# Patient Record
Sex: FEMALE | Race: WHITE | Employment: STUDENT | URBAN - METROPOLITAN AREA
[De-identification: names, ages, dates, MRNs, and addresses within clinical notes are randomized per-mention and may not be internally consistent; named-entity substitution may affect disease eponyms.]

---
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
## 2023-04-06 ENCOUNTER — HOSPITAL ENCOUNTER (OUTPATIENT)
Age: 18
Discharge: HOME | End: 2023-04-06
Payer: MEDICAID

## 2023-04-06 VITALS — OXYGEN SATURATION: 100 % | RESPIRATION RATE: 18 BRPM | HEART RATE: 72 BPM | TEMPERATURE: 98 F

## 2023-04-06 DIAGNOSIS — J02.9 SORE THROAT: Primary | ICD-10-CM

## 2023-04-06 DIAGNOSIS — H92.09 EARACHE: ICD-10-CM

## 2023-04-06 LAB
S PYO AG THROAT QL IA.RAPID: NEGATIVE
SARS-COV-2 RNA RESP QL NAA+PROBE: NOT DETECTED

## 2023-04-06 PROCEDURE — 99213 OFFICE O/P EST LOW 20 MIN: CPT

## 2023-04-06 PROCEDURE — 87651 STREP A DNA AMP PROBE: CPT | Performed by: PHYSICIAN ASSISTANT

## 2023-04-06 RX ORDER — DEXAMETHASONE SODIUM PHOSPHATE 10 MG/ML
10 INJECTION, SOLUTION INTRAMUSCULAR; INTRAVENOUS ONCE
Status: COMPLETED | OUTPATIENT
Start: 2023-04-06 | End: 2023-04-06

## 2023-04-06 RX ORDER — AMOXICILLIN 500 MG/1
500 TABLET, FILM COATED ORAL 3 TIMES DAILY
Qty: 30 TABLET | Refills: 0 | Status: SHIPPED | OUTPATIENT
Start: 2023-04-06 | End: 2023-04-16

## 2023-04-06 NOTE — DISCHARGE INSTRUCTIONS
Would recommend Advil Cold and Sinus (Sudafed-containing) x 2 days    Amoxicillin if no significant improvement in ear pain

## 2023-05-30 ENCOUNTER — LAB ENCOUNTER (OUTPATIENT)
Dept: LAB | Age: 18
End: 2023-05-30
Attending: NURSE PRACTITIONER
Payer: MEDICAID

## 2023-05-30 ENCOUNTER — OFFICE VISIT (OUTPATIENT)
Dept: INTERNAL MEDICINE CLINIC | Facility: CLINIC | Age: 18
End: 2023-05-30

## 2023-05-30 VITALS
SYSTOLIC BLOOD PRESSURE: 119 MMHG | WEIGHT: 209 LBS | HEIGHT: 59 IN | HEART RATE: 86 BPM | BODY MASS INDEX: 42.13 KG/M2 | DIASTOLIC BLOOD PRESSURE: 73 MMHG

## 2023-05-30 DIAGNOSIS — Z13.0 SCREENING FOR DEFICIENCY ANEMIA: ICD-10-CM

## 2023-05-30 DIAGNOSIS — M35.9 AUTOIMMUNE DISEASE (HCC): Primary | ICD-10-CM

## 2023-05-30 DIAGNOSIS — M35.9 AUTOIMMUNE DISEASE (HCC): ICD-10-CM

## 2023-05-30 DIAGNOSIS — Z13.29 THYROID DISORDER SCREEN: ICD-10-CM

## 2023-05-30 DIAGNOSIS — Z98.811: ICD-10-CM

## 2023-05-30 DIAGNOSIS — Z13.21 ENCOUNTER FOR VITAMIN DEFICIENCY SCREENING: ICD-10-CM

## 2023-05-30 DIAGNOSIS — S89.92XA INJURY OF LEFT KNEE, INITIAL ENCOUNTER: ICD-10-CM

## 2023-05-30 DIAGNOSIS — Z00.00 WELLNESS EXAMINATION: ICD-10-CM

## 2023-05-30 DIAGNOSIS — Z00.00 WELLNESS EXAMINATION: Primary | ICD-10-CM

## 2023-05-30 DIAGNOSIS — G90.4 AUTONOMIC DYSREFLEXIA: ICD-10-CM

## 2023-05-30 DIAGNOSIS — Z13.220 LIPID SCREENING: ICD-10-CM

## 2023-05-30 DIAGNOSIS — J03.01 RECURRENT STREPTOCOCCAL TONSILLITIS: ICD-10-CM

## 2023-05-30 DIAGNOSIS — S14.109A INJURY OF CERVICAL SPINE, INITIAL ENCOUNTER (HCC): ICD-10-CM

## 2023-05-30 LAB
ALBUMIN SERPL-MCNC: 4.3 G/DL (ref 3.4–5)
ALBUMIN/GLOB SERPL: 1 {RATIO} (ref 1–2)
ALP LIVER SERPL-CCNC: 65 U/L
ALT SERPL-CCNC: 49 U/L
ANION GAP SERPL CALC-SCNC: 5 MMOL/L (ref 0–18)
AST SERPL-CCNC: 32 U/L (ref 15–37)
BASOPHILS # BLD AUTO: 0.03 X10(3) UL (ref 0–0.2)
BASOPHILS NFR BLD AUTO: 0.4 %
BILIRUB SERPL-MCNC: 0.3 MG/DL (ref 0.1–2)
BUN BLD-MCNC: 14 MG/DL (ref 7–18)
BUN/CREAT SERPL: 21.5 (ref 10–20)
CALCIUM BLD-MCNC: 9.9 MG/DL (ref 8.5–10.1)
CHLORIDE SERPL-SCNC: 110 MMOL/L (ref 98–112)
CHOLEST SERPL-MCNC: 213 MG/DL (ref ?–200)
CO2 SERPL-SCNC: 27 MMOL/L (ref 21–32)
CREAT BLD-MCNC: 0.65 MG/DL
CRP SERPL-MCNC: 2.33 MG/DL (ref ?–0.3)
DEPRECATED RDW RBC AUTO: 40 FL (ref 35.1–46.3)
EOSINOPHIL # BLD AUTO: 0.08 X10(3) UL (ref 0–0.7)
EOSINOPHIL NFR BLD AUTO: 1 %
ERYTHROCYTE [DISTWIDTH] IN BLOOD BY AUTOMATED COUNT: 13.3 % (ref 11–15)
ERYTHROCYTE [SEDIMENTATION RATE] IN BLOOD: 60 MM/HR
FASTING PATIENT LIPID ANSWER: YES
FASTING STATUS PATIENT QL REPORTED: YES
GFR SERPLBLD BASED ON 1.73 SQ M-ARVRAT: 130 ML/MIN/1.73M2 (ref 60–?)
GLOBULIN PLAS-MCNC: 4.1 G/DL (ref 2.8–4.4)
GLUCOSE BLD-MCNC: 84 MG/DL (ref 70–99)
HCT VFR BLD AUTO: 38.7 %
HDLC SERPL-MCNC: 30 MG/DL (ref 40–59)
HGB BLD-MCNC: 12.7 G/DL
IMM GRANULOCYTES # BLD AUTO: 0.08 X10(3) UL (ref 0–1)
IMM GRANULOCYTES NFR BLD: 1 %
LDLC SERPL CALC-MCNC: 136 MG/DL (ref ?–100)
LYMPHOCYTES # BLD AUTO: 2.31 X10(3) UL (ref 1.5–5)
LYMPHOCYTES NFR BLD AUTO: 30.2 %
MCH RBC QN AUTO: 27.4 PG (ref 26–34)
MCHC RBC AUTO-ENTMCNC: 32.8 G/DL (ref 31–37)
MCV RBC AUTO: 83.4 FL
MONOCYTES # BLD AUTO: 0.48 X10(3) UL (ref 0.1–1)
MONOCYTES NFR BLD AUTO: 6.3 %
NEUTROPHILS # BLD AUTO: 4.68 X10 (3) UL (ref 1.5–7.7)
NEUTROPHILS # BLD AUTO: 4.68 X10(3) UL (ref 1.5–7.7)
NEUTROPHILS NFR BLD AUTO: 61.1 %
NONHDLC SERPL-MCNC: 183 MG/DL (ref ?–130)
OSMOLALITY SERPL CALC.SUM OF ELEC: 294 MOSM/KG (ref 275–295)
PLATELET # BLD AUTO: 313 10(3)UL (ref 150–450)
POTASSIUM SERPL-SCNC: 4 MMOL/L (ref 3.5–5.1)
PROT SERPL-MCNC: 8.4 G/DL (ref 6.4–8.2)
RBC # BLD AUTO: 4.64 X10(6)UL
SODIUM SERPL-SCNC: 142 MMOL/L (ref 136–145)
TRIGL SERPL-MCNC: 259 MG/DL (ref 30–149)
TSI SER-ACNC: 1.25 MIU/ML (ref 0.36–3.74)
VIT D+METAB SERPL-MCNC: 16.9 NG/ML (ref 30–100)
VLDLC SERPL CALC-MCNC: 48 MG/DL (ref 0–30)
WBC # BLD AUTO: 7.7 X10(3) UL (ref 4–11)

## 2023-05-30 PROCEDURE — 3074F SYST BP LT 130 MM HG: CPT | Performed by: NURSE PRACTITIONER

## 2023-05-30 PROCEDURE — 80053 COMPREHEN METABOLIC PANEL: CPT

## 2023-05-30 PROCEDURE — 86140 C-REACTIVE PROTEIN: CPT

## 2023-05-30 PROCEDURE — 86038 ANTINUCLEAR ANTIBODIES: CPT

## 2023-05-30 PROCEDURE — 82300 ASSAY OF CADMIUM: CPT

## 2023-05-30 PROCEDURE — 82175 ASSAY OF ARSENIC: CPT

## 2023-05-30 PROCEDURE — 99204 OFFICE O/P NEW MOD 45 MIN: CPT | Performed by: NURSE PRACTITIONER

## 2023-05-30 PROCEDURE — 86225 DNA ANTIBODY NATIVE: CPT

## 2023-05-30 PROCEDURE — 36415 COLL VENOUS BLD VENIPUNCTURE: CPT

## 2023-05-30 PROCEDURE — 82306 VITAMIN D 25 HYDROXY: CPT

## 2023-05-30 PROCEDURE — 84443 ASSAY THYROID STIM HORMONE: CPT

## 2023-05-30 PROCEDURE — 83655 ASSAY OF LEAD: CPT

## 2023-05-30 PROCEDURE — 3078F DIAST BP <80 MM HG: CPT | Performed by: NURSE PRACTITIONER

## 2023-05-30 PROCEDURE — 3008F BODY MASS INDEX DOCD: CPT | Performed by: NURSE PRACTITIONER

## 2023-05-30 PROCEDURE — 83825 ASSAY OF MERCURY: CPT

## 2023-05-30 PROCEDURE — 99385 PREV VISIT NEW AGE 18-39: CPT | Performed by: NURSE PRACTITIONER

## 2023-05-30 PROCEDURE — 85025 COMPLETE CBC W/AUTO DIFF WBC: CPT

## 2023-05-30 PROCEDURE — 80061 LIPID PANEL: CPT

## 2023-05-30 PROCEDURE — 85652 RBC SED RATE AUTOMATED: CPT

## 2023-06-01 LAB
ARSENIC BLOOD: 3 UG/L
CADMIUM, BLOOD: <0.5 UG/L
DSDNA IGG SERPL IA-ACNC: 2.3 IU/ML
ENA AB SER QL IA: 0.1 UG/L
ENA AB SER QL IA: NEGATIVE
LEAD BLOOD: <1 UG/DL
MERCURY BLOOD: 4.1 UG/L

## 2023-10-25 ENCOUNTER — PATIENT MESSAGE (OUTPATIENT)
Dept: INTERNAL MEDICINE CLINIC | Facility: CLINIC | Age: 18
End: 2023-10-25

## 2023-10-26 NOTE — TELEPHONE ENCOUNTER
From: Lady Downey  To: Carlin Washington. Sas  Sent: 10/25/2023 4:16 PM CDT  Subject: UTI    Hi Kedar Bhandari, I have a UTI and I left my prescription in [place]. Is there anyway you can send me one to the pharmacy?

## 2023-10-30 ENCOUNTER — OFFICE VISIT (OUTPATIENT)
Dept: INTERNAL MEDICINE CLINIC | Facility: CLINIC | Age: 18
End: 2023-10-30

## 2023-10-30 VITALS
HEART RATE: 101 BPM | WEIGHT: 209 LBS | SYSTOLIC BLOOD PRESSURE: 135 MMHG | HEIGHT: 59 IN | BODY MASS INDEX: 42.13 KG/M2 | DIASTOLIC BLOOD PRESSURE: 82 MMHG

## 2023-10-30 DIAGNOSIS — N63.0 MASS OF BREAST, UNSPECIFIED LATERALITY: ICD-10-CM

## 2023-10-30 DIAGNOSIS — N83.201 BILATERAL OVARIAN CYSTS: Primary | ICD-10-CM

## 2023-10-30 DIAGNOSIS — N83.202 BILATERAL OVARIAN CYSTS: Primary | ICD-10-CM

## 2023-10-30 PROCEDURE — 3008F BODY MASS INDEX DOCD: CPT | Performed by: NURSE PRACTITIONER

## 2023-10-30 PROCEDURE — 3079F DIAST BP 80-89 MM HG: CPT | Performed by: NURSE PRACTITIONER

## 2023-10-30 PROCEDURE — 99214 OFFICE O/P EST MOD 30 MIN: CPT | Performed by: NURSE PRACTITIONER

## 2023-10-30 PROCEDURE — 3075F SYST BP GE 130 - 139MM HG: CPT | Performed by: NURSE PRACTITIONER

## 2023-11-07 ENCOUNTER — HOSPITAL ENCOUNTER (OUTPATIENT)
Age: 18
Discharge: OTHER INSTITUTION | End: 2023-11-07
Payer: MEDICAID

## 2023-11-07 ENCOUNTER — APPOINTMENT (OUTPATIENT)
Dept: ULTRASOUND IMAGING | Age: 18
End: 2023-11-07
Attending: PHYSICIAN ASSISTANT
Payer: MEDICAID

## 2023-11-07 ENCOUNTER — APPOINTMENT (OUTPATIENT)
Dept: CT IMAGING | Age: 18
End: 2023-11-07
Attending: PHYSICIAN ASSISTANT
Payer: MEDICAID

## 2023-11-07 ENCOUNTER — HOSPITAL ENCOUNTER (INPATIENT)
Facility: HOSPITAL | Age: 18
LOS: 1 days | Discharge: HOME | End: 2023-11-09
Attending: EMERGENCY MEDICINE | Admitting: STUDENT IN AN ORGANIZED HEALTH CARE EDUCATION/TRAINING PROGRAM
Payer: MEDICAID

## 2023-11-07 ENCOUNTER — HOSPITAL ENCOUNTER (OUTPATIENT)
Age: 18
Discharge: HOME | End: 2023-11-07
Payer: MEDICAID

## 2023-11-07 VITALS
HEART RATE: 107 BPM | TEMPERATURE: 100 F | OXYGEN SATURATION: 96 % | SYSTOLIC BLOOD PRESSURE: 155 MMHG | DIASTOLIC BLOOD PRESSURE: 75 MMHG | RESPIRATION RATE: 18 BRPM

## 2023-11-07 DIAGNOSIS — N70.93 TOA (TUBO-OVARIAN ABSCESS): Primary | ICD-10-CM

## 2023-11-07 LAB
#MXD IC: 0.9 X10ˆ3/UL (ref 0.1–1)
ALBUMIN SERPL-MCNC: 4.7 G/DL (ref 3.2–4.8)
ALP LIVER SERPL-CCNC: 78 U/L
ALT SERPL-CCNC: 31 U/L
ANION GAP SERPL CALC-SCNC: 10 MMOL/L (ref 0–18)
AST SERPL-CCNC: 21 U/L (ref ?–34)
B-HCG UR QL: NEGATIVE
BASOPHILS # BLD AUTO: 0.02 X10(3) UL (ref 0–0.2)
BASOPHILS NFR BLD AUTO: 0.1 %
BILIRUB DIRECT SERPL-MCNC: <0.1 MG/DL (ref ?–0.3)
BILIRUB SERPL-MCNC: 0.3 MG/DL (ref 0.3–1.2)
BILIRUB UR QL STRIP: NEGATIVE
BUN BLD-MCNC: 7 MG/DL (ref 9–23)
BUN BLD-MCNC: 8 MG/DL (ref 7–18)
BUN/CREAT SERPL: 9.2 (ref 10–20)
CALCIUM BLD-MCNC: 9.6 MG/DL (ref 8.7–10.4)
CHLORIDE BLD-SCNC: 105 MMOL/L (ref 98–112)
CHLORIDE SERPL-SCNC: 106 MMOL/L (ref 98–112)
CLARITY UR: CLEAR
CO2 BLD-SCNC: 24 MMOL/L (ref 21–32)
CO2 SERPL-SCNC: 22 MMOL/L (ref 21–32)
COLOR UR: YELLOW
CREAT BLD-MCNC: 0.7 MG/DL
CREAT BLD-MCNC: 0.76 MG/DL
DEPRECATED RDW RBC AUTO: 40.3 FL (ref 35.1–46.3)
EGFRCR SERPLBLD CKD-EPI 2021: 116 ML/MIN/1.73M2 (ref 60–?)
EGFRCR SERPLBLD CKD-EPI 2021: 128 ML/MIN/1.73M2 (ref 60–?)
EOSINOPHIL # BLD AUTO: 0.02 X10(3) UL (ref 0–0.7)
EOSINOPHIL NFR BLD AUTO: 0.1 %
ERYTHROCYTE [DISTWIDTH] IN BLOOD BY AUTOMATED COUNT: 13.2 % (ref 11–15)
GLUCOSE BLD-MCNC: 106 MG/DL (ref 70–99)
GLUCOSE BLD-MCNC: 99 MG/DL (ref 70–99)
GLUCOSE UR STRIP-MCNC: NEGATIVE MG/DL
HCT VFR BLD AUTO: 35.6 %
HCT VFR BLD AUTO: 36.6 %
HCT VFR BLD CALC: 37 %
HGB BLD-MCNC: 12.1 G/DL
HGB BLD-MCNC: 12.2 G/DL
IMM GRANULOCYTES # BLD AUTO: 0.07 X10(3) UL (ref 0–1)
IMM GRANULOCYTES NFR BLD: 0.5 %
ISTAT IONIZED CALCIUM FOR CHEM 8: 1.17 MMOL/L (ref 1.12–1.32)
KETONES UR STRIP-MCNC: NEGATIVE MG/DL
LACTATE SERPL-SCNC: 0.8 MMOL/L (ref 0.5–2)
LYMPHOCYTES # BLD AUTO: 1.4 X10ˆ3/UL (ref 1.5–5)
LYMPHOCYTES # BLD AUTO: 1.85 X10(3) UL (ref 1.5–5)
LYMPHOCYTES NFR BLD AUTO: 11 %
LYMPHOCYTES NFR BLD AUTO: 13.8 %
MCH RBC QN AUTO: 27.5 PG (ref 26–34)
MCH RBC QN AUTO: 28.1 PG (ref 26–34)
MCHC RBC AUTO-ENTMCNC: 33.3 G/DL (ref 31–37)
MCHC RBC AUTO-ENTMCNC: 34 G/DL (ref 31–37)
MCV RBC AUTO: 82.6 FL
MCV RBC AUTO: 82.6 FL (ref 80–100)
MIXED CELL %: 7.1 %
MONOCYTES # BLD AUTO: 0.86 X10(3) UL (ref 0.1–1)
MONOCYTES NFR BLD AUTO: 6.4 %
NEUTROPHILS # BLD AUTO: 10.2 X10ˆ3/UL (ref 1.5–7.7)
NEUTROPHILS # BLD AUTO: 10.58 X10 (3) UL (ref 1.5–7.7)
NEUTROPHILS # BLD AUTO: 10.58 X10(3) UL (ref 1.5–7.7)
NEUTROPHILS NFR BLD AUTO: 79.1 %
NEUTROPHILS NFR BLD AUTO: 81.9 %
NITRITE UR QL STRIP: POSITIVE
OSMOLALITY SERPL CALC.SUM OF ELEC: 284 MOSM/KG (ref 275–295)
PH UR STRIP: 7.5 [PH]
PLATELET # BLD AUTO: 315 X10ˆ3/UL (ref 150–450)
PLATELET # BLD AUTO: 331 10(3)UL (ref 150–450)
POTASSIUM BLD-SCNC: 4 MMOL/L (ref 3.6–5.1)
POTASSIUM SERPL-SCNC: 3.8 MMOL/L (ref 3.5–5.1)
PROT SERPL-MCNC: 8.2 G/DL (ref 5.7–8.2)
PROT UR STRIP-MCNC: 30 MG/DL
RBC # BLD AUTO: 4.31 X10(6)UL
RBC # BLD AUTO: 4.43 X10ˆ6/UL
SODIUM BLD-SCNC: 140 MMOL/L (ref 136–145)
SODIUM SERPL-SCNC: 138 MMOL/L (ref 136–145)
SP GR UR STRIP: 1.02
UROBILINOGEN UR STRIP-ACNC: 2 MG/DL
WBC # BLD AUTO: 12.5 X10ˆ3/UL (ref 4–11)
WBC # BLD AUTO: 13.4 X10(3) UL (ref 4–11)

## 2023-11-07 PROCEDURE — 87086 URINE CULTURE/COLONY COUNT: CPT | Performed by: PHYSICIAN ASSISTANT

## 2023-11-07 PROCEDURE — 85025 COMPLETE CBC W/AUTO DIFF WBC: CPT | Performed by: PHYSICIAN ASSISTANT

## 2023-11-07 PROCEDURE — 99215 OFFICE O/P EST HI 40 MIN: CPT

## 2023-11-07 PROCEDURE — 81025 URINE PREGNANCY TEST: CPT

## 2023-11-07 PROCEDURE — 87591 N.GONORRHOEAE DNA AMP PROB: CPT | Performed by: PHYSICIAN ASSISTANT

## 2023-11-07 PROCEDURE — 87491 CHLMYD TRACH DNA AMP PROBE: CPT | Performed by: PHYSICIAN ASSISTANT

## 2023-11-07 PROCEDURE — 76830 TRANSVAGINAL US NON-OB: CPT | Performed by: PHYSICIAN ASSISTANT

## 2023-11-07 PROCEDURE — 96374 THER/PROPH/DIAG INJ IV PUSH: CPT

## 2023-11-07 PROCEDURE — 81514 NFCT DS BV&VAGINITIS DNA ALG: CPT | Performed by: PHYSICIAN ASSISTANT

## 2023-11-07 PROCEDURE — 96361 HYDRATE IV INFUSION ADD-ON: CPT

## 2023-11-07 PROCEDURE — 76856 US EXAM PELVIC COMPLETE: CPT | Performed by: PHYSICIAN ASSISTANT

## 2023-11-07 PROCEDURE — 80047 BASIC METABLC PNL IONIZED CA: CPT

## 2023-11-07 PROCEDURE — 81002 URINALYSIS NONAUTO W/O SCOPE: CPT

## 2023-11-07 PROCEDURE — 74177 CT ABD & PELVIS W/CONTRAST: CPT | Performed by: PHYSICIAN ASSISTANT

## 2023-11-07 RX ORDER — METRONIDAZOLE 500 MG/100ML
500 INJECTION, SOLUTION INTRAVENOUS ONCE
Status: COMPLETED | OUTPATIENT
Start: 2023-11-07 | End: 2023-11-07

## 2023-11-07 RX ORDER — KETOROLAC TROMETHAMINE 15 MG/ML
15 INJECTION, SOLUTION INTRAMUSCULAR; INTRAVENOUS ONCE
Status: COMPLETED | OUTPATIENT
Start: 2023-11-07 | End: 2023-11-07

## 2023-11-07 RX ORDER — DOXYCYCLINE HYCLATE 100 MG/1
100 CAPSULE ORAL EVERY 12 HOURS SCHEDULED
Status: CANCELLED | OUTPATIENT
Start: 2023-11-08

## 2023-11-07 RX ORDER — DOXYCYCLINE HYCLATE 100 MG/1
100 CAPSULE ORAL 2 TIMES DAILY
Qty: 14 CAPSULE | Refills: 0 | Status: ON HOLD | OUTPATIENT
Start: 2023-11-07 | End: 2023-11-09

## 2023-11-07 RX ORDER — 0.9 % SODIUM CHLORIDE 0.9 %
1000 INTRAVENOUS SOLUTION INTRAVENOUS ONCE
Status: COMPLETED | OUTPATIENT
Start: 2023-11-07 | End: 2023-11-07

## 2023-11-07 RX ORDER — IOHEXOL 350 MG/ML
80 INJECTION, SOLUTION INTRAVENOUS
Status: COMPLETED | OUTPATIENT
Start: 2023-11-07 | End: 2023-11-07

## 2023-11-07 RX ORDER — CEFTRIAXONE 500 MG/1
1 INJECTION, POWDER, FOR SOLUTION INTRAMUSCULAR; INTRAVENOUS ONCE
Status: COMPLETED | OUTPATIENT
Start: 2023-11-07 | End: 2023-11-07

## 2023-11-07 NOTE — ED INITIAL ASSESSMENT (HPI)
Patient arrived ambulatory to room c/o burning with urinating. +urgency/frequency. +generalized abdominal and back discomfort. No n/v/d. No hematuria. Fevers initially 1 week ago but have since resolved.

## 2023-11-08 LAB
ANION GAP SERPL CALC-SCNC: 7 MMOL/L (ref 0–18)
ATRIAL RATE: 123 BPM
BASOPHILS # BLD AUTO: 0.02 X10(3) UL (ref 0–0.2)
BASOPHILS NFR BLD AUTO: 0.2 %
BUN BLD-MCNC: 8 MG/DL (ref 9–23)
BUN/CREAT SERPL: 12.7 (ref 10–20)
BV BACTERIA DNA VAG QL NAA+PROBE: NEGATIVE
C GLABRATA DNA VAG QL NAA+PROBE: NEGATIVE
C KRUSEI DNA VAG QL NAA+PROBE: NEGATIVE
C TRACH DNA SPEC QL NAA+PROBE: NEGATIVE
CALCIUM BLD-MCNC: 9.4 MG/DL (ref 8.7–10.4)
CANDIDA DNA VAG QL NAA+PROBE: NEGATIVE
CHLORIDE SERPL-SCNC: 111 MMOL/L (ref 98–112)
CO2 SERPL-SCNC: 26 MMOL/L (ref 21–32)
CREAT BLD-MCNC: 0.63 MG/DL
DEPRECATED RDW RBC AUTO: 40.7 FL (ref 35.1–46.3)
EGFRCR SERPLBLD CKD-EPI 2021: 131 ML/MIN/1.73M2 (ref 60–?)
EOSINOPHIL # BLD AUTO: 0.03 X10(3) UL (ref 0–0.7)
EOSINOPHIL NFR BLD AUTO: 0.4 %
ERYTHROCYTE [DISTWIDTH] IN BLOOD BY AUTOMATED COUNT: 13.3 % (ref 11–15)
GLUCOSE BLD-MCNC: 89 MG/DL (ref 70–99)
HCT VFR BLD AUTO: 32.8 %
HGB BLD-MCNC: 10.9 G/DL
IMM GRANULOCYTES # BLD AUTO: 0.06 X10(3) UL (ref 0–1)
IMM GRANULOCYTES NFR BLD: 0.7 %
LYMPHOCYTES # BLD AUTO: 1.62 X10(3) UL (ref 1.5–5)
LYMPHOCYTES NFR BLD AUTO: 19.4 %
MCH RBC QN AUTO: 27.8 PG (ref 26–34)
MCHC RBC AUTO-ENTMCNC: 33.2 G/DL (ref 31–37)
MCV RBC AUTO: 83.7 FL
MONOCYTES # BLD AUTO: 0.69 X10(3) UL (ref 0.1–1)
MONOCYTES NFR BLD AUTO: 8.3 %
N GONORRHOEA DNA SPEC QL NAA+PROBE: NEGATIVE
NEUTROPHILS # BLD AUTO: 5.91 X10 (3) UL (ref 1.5–7.7)
NEUTROPHILS # BLD AUTO: 5.91 X10(3) UL (ref 1.5–7.7)
NEUTROPHILS NFR BLD AUTO: 71 %
OSMOLALITY SERPL CALC.SUM OF ELEC: 296 MOSM/KG (ref 275–295)
P AXIS: 40 DEGREES
P-R INTERVAL: 130 MS
PLATELET # BLD AUTO: 298 10(3)UL (ref 150–450)
POTASSIUM SERPL-SCNC: 4 MMOL/L (ref 3.5–5.1)
Q-T INTERVAL: 312 MS
QRS DURATION: 86 MS
QTC CALCULATION (BEZET): 446 MS
R AXIS: 60 DEGREES
RBC # BLD AUTO: 3.92 X10(6)UL
SODIUM SERPL-SCNC: 144 MMOL/L (ref 136–145)
T AXIS: 26 DEGREES
T VAGINALIS DNA VAG QL NAA+PROBE: NEGATIVE
VENTRICULAR RATE: 123 BPM
WBC # BLD AUTO: 8.3 X10(3) UL (ref 4–11)

## 2023-11-08 PROCEDURE — 99254 IP/OBS CNSLTJ NEW/EST MOD 60: CPT | Performed by: OBSTETRICS & GYNECOLOGY

## 2023-11-08 PROCEDURE — 99232 SBSQ HOSP IP/OBS MODERATE 35: CPT | Performed by: INTERNAL MEDICINE

## 2023-11-08 RX ORDER — ACETAMINOPHEN 500 MG
500 TABLET ORAL EVERY 4 HOURS PRN
Status: DISCONTINUED | OUTPATIENT
Start: 2023-11-08 | End: 2023-11-09

## 2023-11-08 RX ORDER — POLYETHYLENE GLYCOL 3350 17 G/17G
17 POWDER, FOR SOLUTION ORAL DAILY PRN
Status: DISCONTINUED | OUTPATIENT
Start: 2023-11-08 | End: 2023-11-09

## 2023-11-08 RX ORDER — ONDANSETRON 2 MG/ML
4 INJECTION INTRAMUSCULAR; INTRAVENOUS EVERY 6 HOURS PRN
Status: DISCONTINUED | OUTPATIENT
Start: 2023-11-08 | End: 2023-11-09

## 2023-11-08 RX ORDER — ENEMA 19; 7 G/133ML; G/133ML
1 ENEMA RECTAL ONCE AS NEEDED
Status: DISCONTINUED | OUTPATIENT
Start: 2023-11-08 | End: 2023-11-09

## 2023-11-08 RX ORDER — BISACODYL 10 MG
10 SUPPOSITORY, RECTAL RECTAL
Status: DISCONTINUED | OUTPATIENT
Start: 2023-11-08 | End: 2023-11-09

## 2023-11-08 RX ORDER — METRONIDAZOLE 500 MG/100ML
500 INJECTION, SOLUTION INTRAVENOUS EVERY 8 HOURS
Status: DISCONTINUED | OUTPATIENT
Start: 2023-11-08 | End: 2023-11-09

## 2023-11-08 RX ORDER — SENNOSIDES 8.6 MG
17.2 TABLET ORAL NIGHTLY PRN
Status: DISCONTINUED | OUTPATIENT
Start: 2023-11-08 | End: 2023-11-09

## 2023-11-08 RX ORDER — PROCHLORPERAZINE EDISYLATE 5 MG/ML
5 INJECTION INTRAMUSCULAR; INTRAVENOUS EVERY 8 HOURS PRN
Status: DISCONTINUED | OUTPATIENT
Start: 2023-11-08 | End: 2023-11-09

## 2023-11-08 RX ORDER — SODIUM CHLORIDE 9 MG/ML
INJECTION, SOLUTION INTRAVENOUS CONTINUOUS
Status: DISCONTINUED | OUTPATIENT
Start: 2023-11-08 | End: 2023-11-08

## 2023-11-08 NOTE — ED QUICK NOTES
Orders for admission, patient is aware of plan and ready to go upstairs. Any questions, please call ED RN Imelda FELICIANO at extension [phone].      Patient Covid vaccination status: Unvaccinated     COVID Test Ordered in ED: None    COVID Suspicion at Admission: N/A    Running Infusions:      Mental Status/LOC at time of transport: A&Ox4    Other pertinent information:   CIWA score: N/A   NIH score:  N/A

## 2023-11-08 NOTE — CM/SW NOTE
11/08/23 1100   CM/SW Referral Data   Referral Source Patient; Family  (w/pt consent spoke in front of mother and multiple siblings)   Reason for Referral Discharge planning   Informant Patient   Medical Hx   Does patient have an established PCP? Yes   Significant Past Medical/Mental Health Hx Hx of ovarian abscess- treated with oral abx   Patient Info   Advanced directives? No   Patient's Current Mental Status at Time of Assessment Alert;Oriented   Patient's [address]   Number of Levels in Home 1   Patient lives with Parent(s);Sibling   Patient Status Prior to Admission   Independent with ADLs and Mobility Yes   Discharge Needs   Anticipated D/C needs No anticipated discharge needs     SW and RN spoke to pt. At bedside. All options for possible IV abx discussed and pt. verbalized understanding. Pt. Will return to work and will be starting new job as a  11/13. / to remain available for support and/or discharge planning.      Shannon Townsend MSW, [address] [facility]

## 2023-11-08 NOTE — PROGRESS NOTES
Spiritual Care Visit Note    Visited With: (P) Patient and family together    Spiritual Care Taxonomy:    Intended Effects: (P) Demonstrate caring and concern    Methods: (P) Demonstrate acceptance; Offer support    Interventions: (P) Acknowledge current situation; Active listening;Prayer for healing    Writer met with pt and her sister, Veronica Argueta, at bedside. She stated she was doing alright and appreciates her family being around. She said hospitals make her nervous so we discussed that feeling. She asked for prayers of healing. Writer prayed for her, her health, and her loved ones' support in this process. Writer let her know Spiritual Care remains available to her for further support. Spiritual Care support can be requested via an Epic consult.       Nadia Kramer, [facility] Resident  [facility] AND [facility], Spiritual Care Department   On-Call 24/7 via [facility] consult

## 2023-11-08 NOTE — ED INITIAL ASSESSMENT (HPI)
Pt presents to ed from [address] [place] [address] for admission. Pt states she was at [address] [place] [address] for UTI symptoms and was told she had an abscess on her fallopian tube. Pt states IC told her to come to ed for admission d/t abscess on fallopian tube. Pt denies pain. Pt Aox4, pt is her own historian.

## 2023-11-09 VITALS
DIASTOLIC BLOOD PRESSURE: 87 MMHG | OXYGEN SATURATION: 97 % | WEIGHT: 209 LBS | HEART RATE: 77 BPM | BODY MASS INDEX: 42.13 KG/M2 | TEMPERATURE: 98 F | RESPIRATION RATE: 18 BRPM | HEIGHT: 59 IN | SYSTOLIC BLOOD PRESSURE: 148 MMHG

## 2023-11-09 LAB
ANION GAP SERPL CALC-SCNC: 5 MMOL/L (ref 0–18)
BASOPHILS # BLD AUTO: 0.01 X10(3) UL (ref 0–0.2)
BASOPHILS NFR BLD AUTO: 0.2 %
BUN BLD-MCNC: 10 MG/DL (ref 9–23)
BUN/CREAT SERPL: 14.5 (ref 10–20)
CALCIUM BLD-MCNC: 9.4 MG/DL (ref 8.7–10.4)
CHLORIDE SERPL-SCNC: 111 MMOL/L (ref 98–112)
CO2 SERPL-SCNC: 27 MMOL/L (ref 21–32)
CREAT BLD-MCNC: 0.69 MG/DL
DEPRECATED RDW RBC AUTO: 41 FL (ref 35.1–46.3)
EGFRCR SERPLBLD CKD-EPI 2021: 128 ML/MIN/1.73M2 (ref 60–?)
EOSINOPHIL # BLD AUTO: 0.06 X10(3) UL (ref 0–0.7)
EOSINOPHIL NFR BLD AUTO: 1 %
ERYTHROCYTE [DISTWIDTH] IN BLOOD BY AUTOMATED COUNT: 13.2 % (ref 11–15)
GLUCOSE BLD-MCNC: 93 MG/DL (ref 70–99)
HCT VFR BLD AUTO: 33.5 %
HGB BLD-MCNC: 10.9 G/DL
IMM GRANULOCYTES # BLD AUTO: 0.07 X10(3) UL (ref 0–1)
IMM GRANULOCYTES NFR BLD: 1.2 %
LYMPHOCYTES # BLD AUTO: 1.58 X10(3) UL (ref 1.5–5)
LYMPHOCYTES NFR BLD AUTO: 26.1 %
MCH RBC QN AUTO: 27.5 PG (ref 26–34)
MCHC RBC AUTO-ENTMCNC: 32.5 G/DL (ref 31–37)
MCV RBC AUTO: 84.4 FL
MONOCYTES # BLD AUTO: 0.48 X10(3) UL (ref 0.1–1)
MONOCYTES NFR BLD AUTO: 7.9 %
NEUTROPHILS # BLD AUTO: 3.85 X10 (3) UL (ref 1.5–7.7)
NEUTROPHILS # BLD AUTO: 3.85 X10(3) UL (ref 1.5–7.7)
NEUTROPHILS NFR BLD AUTO: 63.6 %
OSMOLALITY SERPL CALC.SUM OF ELEC: 295 MOSM/KG (ref 275–295)
PLATELET # BLD AUTO: 268 10(3)UL (ref 150–450)
POTASSIUM SERPL-SCNC: 4.1 MMOL/L (ref 3.5–5.1)
RBC # BLD AUTO: 3.97 X10(6)UL
SODIUM SERPL-SCNC: 143 MMOL/L (ref 136–145)
WBC # BLD AUTO: 6.1 X10(3) UL (ref 4–11)

## 2023-11-09 PROCEDURE — 99239 HOSP IP/OBS DSCHRG MGMT >30: CPT | Performed by: INTERNAL MEDICINE

## 2023-11-09 RX ORDER — METRONIDAZOLE 500 MG/1
500 TABLET ORAL 3 TIMES DAILY
Qty: 30 TABLET | Refills: 0 | Status: SHIPPED | OUTPATIENT
Start: 2023-11-09 | End: 2023-11-19

## 2023-11-09 RX ORDER — DOXYCYCLINE HYCLATE 100 MG/1
100 CAPSULE ORAL 2 TIMES DAILY
Qty: 14 CAPSULE | Refills: 0 | Status: SHIPPED | OUTPATIENT
Start: 2023-11-10 | End: 2023-11-17

## 2023-11-09 RX ORDER — CEFDINIR 300 MG/1
300 CAPSULE ORAL 2 TIMES DAILY
Qty: 20 CAPSULE | Refills: 0 | Status: SHIPPED | OUTPATIENT
Start: 2023-11-09 | End: 2023-11-19

## 2023-11-09 NOTE — PLAN OF CARE
No acute changes over night. Pt is alert and oriented on RA. VS are stable. Pt voiding freely. Pain managed with oral Tylenol. Pt tolerating general diet. Pt denies nausea or vomiting. Pt is saline locked. IVF abx running as ordered. Pt ambulating independently. Plan is possible discharge today depending on lab results. Call light is within reach and safety measures are in place. Problem: PAIN - ADULT  Goal: Verbalizes/displays adequate comfort level or patient's stated pain goal  Description: INTERVENTIONS:  - Encourage pt to monitor pain and request assistance  - Assess pain using appropriate pain scale  - Administer analgesics based on type and severity of pain and evaluate response  - Implement non-pharmacological measures as appropriate and evaluate response  - Consider cultural and social influences on pain and pain management  - Manage/alleviate anxiety  - Utilize distraction and/or relaxation techniques  - Monitor for opioid side effects  - Notify MD/LIP if interventions unsuccessful or patient reports new pain  - Anticipate increased pain with activity and pre-medicate as appropriate  Outcome: Progressing     Problem: SAFETY ADULT - FALL  Goal: Free from fall injury  Description: INTERVENTIONS:  - Assess pt frequently for physical needs  - Identify cognitive and physical deficits and behaviors that affect risk of falls.   - [place] fall precautions as indicated by assessment.  - Educate pt/family on patient safety including physical limitations  - Instruct pt to call for assistance with activity based on assessment  - Modify environment to reduce risk of injury  - Provide assistive devices as appropriate  - Consider OT/PT consult to assist with strengthening/mobility  - Encourage toileting schedule  Outcome: Progressing     Problem: CARDIOVASCULAR - ADULT  Goal: Absence of cardiac arrhythmias or at baseline  Description: INTERVENTIONS:  - Continuous cardiac monitoring, monitor vital signs, obtain 12 lead EKG if indicated  - Evaluate effectiveness of antiarrhythmic and heart rate control medications as ordered  - Initiate emergency measures for life threatening arrhythmias  - Monitor electrolytes and administer replacement therapy as ordered  Outcome: Progressing     Problem: GASTROINTESTINAL - ADULT  Goal: Minimal or absence of nausea and vomiting  Description: INTERVENTIONS:  - Maintain adequate hydration with IV or PO as ordered and tolerated  - Nasogastric tube to low intermittent suction as ordered  - Evaluate effectiveness of ordered antiemetic medications  - Provide nonpharmacologic comfort measures as appropriate  - Advance diet as tolerated, if ordered  - Obtain nutritional consult as needed  - Evaluate fluid balance  Outcome: Progressing

## 2023-11-10 ENCOUNTER — PATIENT OUTREACH (OUTPATIENT)
Dept: CASE MANAGEMENT | Age: 18
End: 2023-11-10

## 2023-11-21 ENCOUNTER — OFFICE VISIT (OUTPATIENT)
Dept: OBGYN CLINIC | Facility: CLINIC | Age: 18
End: 2023-11-21
Payer: MEDICAID

## 2023-11-21 VITALS
BODY MASS INDEX: 42.74 KG/M2 | HEIGHT: 59 IN | SYSTOLIC BLOOD PRESSURE: 132 MMHG | DIASTOLIC BLOOD PRESSURE: 90 MMHG | WEIGHT: 212 LBS

## 2023-11-21 DIAGNOSIS — N70.93 TOA (TUBO-OVARIAN ABSCESS): Primary | ICD-10-CM

## 2023-11-21 PROCEDURE — 3075F SYST BP GE 130 - 139MM HG: CPT | Performed by: OBSTETRICS & GYNECOLOGY

## 2023-11-21 PROCEDURE — 99213 OFFICE O/P EST LOW 20 MIN: CPT | Performed by: OBSTETRICS & GYNECOLOGY

## 2023-11-21 PROCEDURE — 3080F DIAST BP >= 90 MM HG: CPT | Performed by: OBSTETRICS & GYNECOLOGY

## 2023-11-21 PROCEDURE — 3008F BODY MASS INDEX DOCD: CPT | Performed by: OBSTETRICS & GYNECOLOGY

## 2023-12-22 ENCOUNTER — ULTRASOUND ENCOUNTER (OUTPATIENT)
Dept: OBGYN CLINIC | Facility: CLINIC | Age: 18
End: 2023-12-22
Payer: MEDICAID

## 2023-12-22 DIAGNOSIS — N70.93 TOA (TUBO-OVARIAN ABSCESS): ICD-10-CM

## 2023-12-26 ENCOUNTER — TELEPHONE (OUTPATIENT)
Dept: OBGYN CLINIC | Facility: CLINIC | Age: 18
End: 2023-12-26

## 2023-12-26 NOTE — TELEPHONE ENCOUNTER
Request provider on call review ultrasound results, HEIDI    Per JN:  I recommend ER evaluation if she is having more pain. Her ultrasound is basically normal.    Called pt informed of ultrasound report, per pt having severe pain therefore encouraged to go to ED. Pt agrees.

## 2023-12-26 NOTE — TELEPHONE ENCOUNTER
The patient called and stated that she had an ultrasound done on 12/22/23 but she is still in a lot of pain.

## 2024-01-04 ENCOUNTER — HOSPITAL ENCOUNTER (OUTPATIENT)
Dept: GENERAL RADIOLOGY | Age: 19
Discharge: HOME | End: 2024-01-04
Attending: NURSE PRACTITIONER
Payer: MEDICAID

## 2024-01-04 ENCOUNTER — TELEMEDICINE (OUTPATIENT)
Dept: INTERNAL MEDICINE CLINIC | Facility: CLINIC | Age: 19
End: 2024-01-04
Payer: MEDICAID

## 2024-01-04 DIAGNOSIS — B34.9 ACUTE VIRAL SYNDROME: Primary | ICD-10-CM

## 2024-01-04 DIAGNOSIS — J45.909 COLD-INDUCED ASTHMA, UNSPECIFIED ASTHMA SEVERITY, UNSPECIFIED WHETHER COMPLICATED, UNSPECIFIED WHETHER PERSISTENT: ICD-10-CM

## 2024-01-04 DIAGNOSIS — B34.9 ACUTE VIRAL SYNDROME: ICD-10-CM

## 2024-01-04 PROCEDURE — 71046 X-RAY EXAM CHEST 2 VIEWS: CPT | Performed by: NURSE PRACTITIONER

## 2024-01-04 PROCEDURE — 99214 OFFICE O/P EST MOD 30 MIN: CPT | Performed by: NURSE PRACTITIONER

## 2024-01-04 RX ORDER — BENZONATATE 200 MG/1
200 CAPSULE ORAL 3 TIMES DAILY PRN
Qty: 30 CAPSULE | Refills: 0 | Status: SHIPPED | OUTPATIENT
Start: 2024-01-04

## 2024-01-04 RX ORDER — ALBUTEROL SULFATE 90 UG/1
2 AEROSOL, METERED RESPIRATORY (INHALATION) EVERY 4 HOURS PRN
Qty: 18 G | Refills: 5 | Status: SHIPPED | OUTPATIENT
Start: 2024-01-04

## 2024-01-04 RX ORDER — PREDNISONE 20 MG/1
20 TABLET ORAL 2 TIMES DAILY
Qty: 10 TABLET | Refills: 0 | Status: SHIPPED | OUTPATIENT
Start: 2024-01-04 | End: 2024-01-09

## 2024-01-04 RX ORDER — ECHINACEA PURPUREA EXTRACT 125 MG
2 TABLET ORAL AS NEEDED
Qty: 60 ML | Refills: 0 | Status: SHIPPED | OUTPATIENT
Start: 2024-01-04

## 2024-01-04 NOTE — PROGRESS NOTES
Violeta Ba is a 18 year old female.    This visit is conducted using Telemedicine with live, interactive video and audio.    Patient has been referred to the Critical access hospital website at [email or web address] to review the yearly Consent to Treat document.    Patient understands and accepts financial responsibility for any deductible, co-insurance and/or co-pays associated with this service.   HPI:   Pt recently exposed to pneumonia. She reports cough that started 3 days ago, dry and productive. Pt tested at home for covid and it was negative. Pt reports fever (101.5 highest), chills, body aches, lightheadedness, fatigue, mild sore throat, nasal congestion. Pt reports mild/intermittent SOB, mostly with coughing. Checking her O2 at home at reports it is 97%. She reports cold induced asthma. She does not have an albuterol inhaler.   Denies any CP, coughing up blood, lethargy, headache, palpitations, NVDC, appetite or weight changes.   Current Outpatient Medications   Medication Sig Dispense Refill    albuterol 108 (90 Base) MCG/ACT Inhalation Aero Soln Inhale 2 puffs into the lungs every 4 (four) hours as needed for Wheezing or Shortness of Breath. 18 g 5    benzonatate 200 MG Oral Cap Take 1 capsule (200 mg total) by mouth 3 (three) times daily as needed for cough. 30 capsule 0    sodium chloride 0.65 % Nasal Solution 2 sprays by Nasal route as needed. 60 mL 0    predniSONE 20 MG Oral Tab Take 1 tablet (20 mg total) by mouth 2 (two) times daily for 5 days. 10 tablet 0      Past Medical History:   Diagnosis Date    A-fib ([facility])     Autonomic dysreflexia     Neurogenic bladder     Vertebral fracture, osteoporotic (HCC)       Social History:  Social History     Socioeconomic History    Marital status: Single   Tobacco Use    Smoking status: Never     Passive exposure: Yes    Smokeless tobacco: Never   Vaping Use    Vaping Use: Never used   Substance and Sexual Activity    Alcohol use: Never    Drug use: Never     Social  Determinants of Health     Food Insecurity: No Food Insecurity (11/8/2023)    Food Insecurity     Food Insecurity: Never true   Transportation Needs: No Transportation Needs (11/8/2023)    Transportation Needs     Lack of Transportation: No   Housing Stability: Low Risk  (11/8/2023)    Housing Stability     Housing Instability: No        REVIEW OF SYSTEMS:   Review of Systems   All other systems reviewed and are negative.         EXAM:   [facility] 10/26/2023 (Exact Date)     Physical Exam  Constitutional:       General: She is not in acute distress.  Pulmonary:      Effort: Pulmonary effort is normal. No respiratory distress.   Neurological:      Mental Status: She is alert and oriented to person, place, and time.   Psychiatric:         Mood and Affect: Mood normal.         Thought Content: Thought content normal.         Judgment: Judgment normal.            ASSESSMENT AND PLAN:   1. Acute viral syndrome  -covid test reported negative.  -Chest xray ordered due to close exposure to person with pneumonia.  -start albuterol inhaler, steroid burst. Reviewed use.   -reviewed alarm signs/when to go to ER.   -Nasal saline 2 sprays in each nostril every 3-4 hours as needed.   Flonase 1-2 sprays in each nostril once daily.   Tylenol 500 mg every 6-8 hours as needed (max dose 3000 mg/day).   Hydrate, humidifier, rest, honey/elderberry   - albuterol 108 (90 Base) MCG/ACT Inhalation Aero Soln; Inhale 2 puffs into the lungs every 4 (four) hours as needed for Wheezing or Shortness of Breath.  Dispense: 18 g; Refill: 5  - benzonatate 200 MG Oral Cap; Take 1 capsule (200 mg total) by mouth 3 (three) times daily as needed for cough.  Dispense: 30 capsule; Refill: 0  - sodium chloride 0.65 % Nasal Solution; 2 sprays by Nasal route as needed.  Dispense: 60 mL; Refill: 0  - XR CHEST PA + LAT CHEST (CPT=71046); Future    2. Cold-induced asthma, unspecified asthma severity, unspecified whether complicated, unspecified whether persistent  -as  above.  - albuterol 108 (90 Base) MCG/ACT Inhalation Aero Soln; Inhale 2 puffs into the lungs every 4 (four) hours as needed for Wheezing or Shortness of Breath.  Dispense: 18 g; Refill: 5  - predniSONE 20 MG Oral Tab; Take 1 tablet (20 mg total) by mouth 2 (two) times daily for 5 days.  Dispense: 10 tablet; Refill: 0       The patient indicates understanding of these issues and agrees to the plan.  The patient is asked to return in 3 days/PRN..     The above note was creating using Dragon speech recognition technology. Please excuse any typos.

## 2024-03-07 ENCOUNTER — TELEMEDICINE (OUTPATIENT)
Dept: INTERNAL MEDICINE CLINIC | Facility: CLINIC | Age: 19
End: 2024-03-07
Payer: MEDICAID

## 2024-03-07 DIAGNOSIS — S89.91XA INJURY OF RIGHT KNEE, INITIAL ENCOUNTER: Primary | ICD-10-CM

## 2024-03-07 PROCEDURE — 99213 OFFICE O/P EST LOW 20 MIN: CPT | Performed by: NURSE PRACTITIONER

## 2024-03-08 ENCOUNTER — HOSPITAL ENCOUNTER (OUTPATIENT)
Dept: GENERAL RADIOLOGY | Age: 19
Discharge: HOME | End: 2024-03-08
Attending: NURSE PRACTITIONER
Payer: MEDICAID

## 2024-03-08 DIAGNOSIS — S89.91XA INJURY OF RIGHT KNEE, INITIAL ENCOUNTER: ICD-10-CM

## 2024-03-08 PROCEDURE — 73562 X-RAY EXAM OF KNEE 3: CPT | Performed by: NURSE PRACTITIONER

## 2024-03-08 NOTE — PROGRESS NOTES
Violeta Ba is a 18 year old female.  This visit is conducted using Telemedicine with live, interactive video and audio.    Patient has been referred to the [facility] website at [email or web address] to review the yearly Consent to Treat document.    Patient understands and accepts financial responsibility for any deductible, co-insurance and/or co-pays associated with this service.    HPI:   Patient reports that 1 week ago her dog was running and hit her right knee causing her to fall and land on top of that knee.  She reports her dog weighs about 130 pounds.  Patient did not go to ER or urgent care.  She reports no redness or swelling, however reports anytime she bends the knee if feels like she has sharp glass inside and is worried that she might have broken the knee.  She is able to bear weight but reports it is painful 9 out of 10.  She denies any fever or chills, paresthesias.  She has been keeping it elevated and wrapping it.  Current Outpatient Medications   Medication Sig Dispense Refill    albuterol 108 (90 Base) MCG/ACT Inhalation Aero Soln Inhale 2 puffs into the lungs every 4 (four) hours as needed for Wheezing or Shortness of Breath. 18 g 5    benzonatate 200 MG Oral Cap Take 1 capsule (200 mg total) by mouth 3 (three) times daily as needed for cough. 30 capsule 0    sodium chloride 0.65 % Nasal Solution 2 sprays by Nasal route as needed. 60 mL 0      Past Medical History:   Diagnosis Date    A-fib (HCC)     Autonomic dysreflexia     Neurogenic bladder     Vertebral fracture, osteoporotic (HCC)       Social History:  Social History     Socioeconomic History    Marital status: Single   Tobacco Use    Smoking status: Never     Passive exposure: Yes    Smokeless tobacco: Never   Vaping Use    Vaping Use: Never used   Substance and Sexual Activity    Alcohol use: Never    Drug use: Never     Social Determinants of Health     Food Insecurity: No Food Insecurity (11/8/2023)    Food Insecurity     Food  Insecurity: Never true   Transportation Needs: No Transportation Needs (11/8/2023)    Transportation Needs     Lack of Transportation: No   Housing Stability: Low Risk  (11/8/2023)    Housing Stability     Housing Instability: No        REVIEW OF SYSTEMS:   Review of Systems   All other systems reviewed and are negative.         EXAM:   [facility] 10/26/2023 (Exact Date)     Physical Exam  Constitutional:       General: She is not in acute distress.  Pulmonary:      Effort: Pulmonary effort is normal. No respiratory distress.   Neurological:      Mental Status: She is alert and oriented to person, place, and time.   Psychiatric:         Mood and Affect: Mood normal.         Judgment: Judgment normal.            ASSESSMENT AND PLAN:   1. Injury of right knee, initial encounter  -Rest, ice, elevate, knee immobilizer, Tylenol twice daily as needed.  -Ordering x-ray, pending results will refer to physiatry versus Ortho.  -Reviewed concerning signs and symptoms and when to go to ER.  - XR KNEE (3 VIEWS), RIGHT (CPT=73562); Future       The patient indicates understanding of these issues and agrees to the plan.  The patient is asked to return in PRN.     The above note was creating using Dragon speech recognition technology. Please excuse any typos.

## 2024-04-21 ENCOUNTER — HOSPITAL ENCOUNTER (EMERGENCY)
Facility: HOSPITAL | Age: 19
Discharge: HOME | End: 2024-04-21
Attending: EMERGENCY MEDICINE
Payer: MEDICAID

## 2024-04-21 ENCOUNTER — APPOINTMENT (OUTPATIENT)
Dept: ULTRASOUND IMAGING | Facility: HOSPITAL | Age: 19
End: 2024-04-21
Attending: EMERGENCY MEDICINE
Payer: MEDICAID

## 2024-04-21 VITALS
SYSTOLIC BLOOD PRESSURE: 146 MMHG | HEART RATE: 55 BPM | TEMPERATURE: 98 F | HEIGHT: 59 IN | DIASTOLIC BLOOD PRESSURE: 84 MMHG | OXYGEN SATURATION: 97 % | BODY MASS INDEX: 40.32 KG/M2 | RESPIRATION RATE: 18 BRPM | WEIGHT: 200 LBS

## 2024-04-21 DIAGNOSIS — N30.00 ACUTE CYSTITIS WITHOUT HEMATURIA: Primary | ICD-10-CM

## 2024-04-21 LAB
ANION GAP SERPL CALC-SCNC: 6 MMOL/L (ref 0–18)
B-HCG UR QL: NEGATIVE
BASOPHILS # BLD AUTO: 0.02 X10(3) UL (ref 0–0.2)
BASOPHILS NFR BLD AUTO: 0.2 %
BILIRUB UR QL: NEGATIVE
BUN BLD-MCNC: 12 MG/DL (ref 9–23)
BUN/CREAT SERPL: 15.6 (ref 10–20)
CALCIUM BLD-MCNC: 9.9 MG/DL (ref 8.7–10.4)
CHLORIDE SERPL-SCNC: 110 MMOL/L (ref 98–112)
CO2 SERPL-SCNC: 26 MMOL/L (ref 21–32)
COLOR UR: YELLOW
CREAT BLD-MCNC: 0.77 MG/DL
DEPRECATED RDW RBC AUTO: 39 FL (ref 35.1–46.3)
EGFRCR SERPLBLD CKD-EPI 2021: 113 ML/MIN/1.73M2 (ref 60–?)
EOSINOPHIL # BLD AUTO: 0.05 X10(3) UL (ref 0–0.7)
EOSINOPHIL NFR BLD AUTO: 0.4 %
ERYTHROCYTE [DISTWIDTH] IN BLOOD BY AUTOMATED COUNT: 13.2 % (ref 11–15)
GLUCOSE BLD-MCNC: 94 MG/DL (ref 70–99)
GLUCOSE UR-MCNC: NORMAL MG/DL
HCT VFR BLD AUTO: 39 %
HGB BLD-MCNC: 13.2 G/DL
IMM GRANULOCYTES # BLD AUTO: 0.03 X10(3) UL (ref 0–1)
IMM GRANULOCYTES NFR BLD: 0.3 %
KETONES UR-MCNC: NEGATIVE MG/DL
LEUKOCYTE ESTERASE UR QL STRIP.AUTO: 500
LYMPHOCYTES # BLD AUTO: 1.54 X10(3) UL (ref 1–4)
LYMPHOCYTES NFR BLD AUTO: 13.3 %
MCH RBC QN AUTO: 27.7 PG (ref 26–34)
MCHC RBC AUTO-ENTMCNC: 33.8 G/DL (ref 31–37)
MCV RBC AUTO: 81.8 FL
MONOCYTES # BLD AUTO: 0.67 X10(3) UL (ref 0.1–1)
MONOCYTES NFR BLD AUTO: 5.8 %
NEUTROPHILS # BLD AUTO: 9.25 X10 (3) UL (ref 1.5–7.7)
NEUTROPHILS # BLD AUTO: 9.25 X10(3) UL (ref 1.5–7.7)
NEUTROPHILS NFR BLD AUTO: 80 %
OSMOLALITY SERPL CALC.SUM OF ELEC: 294 MOSM/KG (ref 275–295)
PH UR: 5.5 [PH] (ref 5–8)
PLATELET # BLD AUTO: 253 10(3)UL (ref 150–450)
POTASSIUM SERPL-SCNC: 3.9 MMOL/L (ref 3.5–5.1)
PROT UR-MCNC: 30 MG/DL
RBC # BLD AUTO: 4.77 X10(6)UL
RBC #/AREA URNS AUTO: >10 /HPF
SODIUM SERPL-SCNC: 142 MMOL/L (ref 136–145)
SP GR UR STRIP: 1.01 (ref 1–1.03)
UROBILINOGEN UR STRIP-ACNC: NORMAL
WBC # BLD AUTO: 11.6 X10(3) UL (ref 4–11)
WBC #/AREA URNS AUTO: >50 /HPF
WBC CLUMPS UR QL AUTO: PRESENT /HPF

## 2024-04-21 PROCEDURE — 80048 BASIC METABOLIC PNL TOTAL CA: CPT | Performed by: EMERGENCY MEDICINE

## 2024-04-21 PROCEDURE — 87088 URINE BACTERIA CULTURE: CPT | Performed by: EMERGENCY MEDICINE

## 2024-04-21 PROCEDURE — 76856 US EXAM PELVIC COMPLETE: CPT | Performed by: EMERGENCY MEDICINE

## 2024-04-21 PROCEDURE — 87186 SC STD MICRODIL/AGAR DIL: CPT | Performed by: EMERGENCY MEDICINE

## 2024-04-21 PROCEDURE — 85025 COMPLETE CBC W/AUTO DIFF WBC: CPT | Performed by: EMERGENCY MEDICINE

## 2024-04-21 PROCEDURE — 76830 TRANSVAGINAL US NON-OB: CPT | Performed by: EMERGENCY MEDICINE

## 2024-04-21 PROCEDURE — 81025 URINE PREGNANCY TEST: CPT

## 2024-04-21 PROCEDURE — 81001 URINALYSIS AUTO W/SCOPE: CPT | Performed by: EMERGENCY MEDICINE

## 2024-04-21 PROCEDURE — 93975 VASCULAR STUDY: CPT | Performed by: EMERGENCY MEDICINE

## 2024-04-21 PROCEDURE — 96365 THER/PROPH/DIAG IV INF INIT: CPT

## 2024-04-21 PROCEDURE — 87086 URINE CULTURE/COLONY COUNT: CPT | Performed by: EMERGENCY MEDICINE

## 2024-04-21 PROCEDURE — 99284 EMERGENCY DEPT VISIT MOD MDM: CPT

## 2024-04-21 PROCEDURE — 96375 TX/PRO/DX INJ NEW DRUG ADDON: CPT

## 2024-04-21 RX ORDER — KETOROLAC TROMETHAMINE 15 MG/ML
15 INJECTION, SOLUTION INTRAMUSCULAR; INTRAVENOUS ONCE
Status: COMPLETED | OUTPATIENT
Start: 2024-04-21 | End: 2024-04-21

## 2024-04-21 RX ORDER — CEPHALEXIN 500 MG/1
500 CAPSULE ORAL 2 TIMES DAILY
Qty: 14 CAPSULE | Refills: 0 | Status: SHIPPED | OUTPATIENT
Start: 2024-04-21 | End: 2024-04-28

## 2024-04-21 NOTE — ED INITIAL ASSESSMENT (HPI)
Pt presents to the Ed with worsening vaginal bleeding for one day. +urinary frequency and dysuria. Pt reports having an ovarian cyst burst today. History of PCOS

## 2024-04-21 NOTE — ED PROVIDER NOTES
Patient Seen in: [facility] Emergency Department      History     Chief Complaint   Patient presents with    Pelvic Pain     Stated Complaint: bleeding    Subjective:   HPI  18-year-old female with history of neurogenic bladder, history of C1 and C2 fractures status post fusion, atrial fibrillation, autonomic dysreflexia, history of TOA, PCOS, who presents for evaluation of suprapubic and pelvic pain.  She is concerned about a UTI because she is urinating frequently.  She also was worried that she had a cyst rupture this morning in relation to her PCOS.  She was having some vaginal bleeding which is slowing down now and only has bleeding when she wipes with toilet paper.  She has pain at the suprapubic area and left lower pelvis.  No fevers.  No back or flank pain.  No vomiting.  No concern for STI.      Objective:   Past Medical History:    A-fib (HCC)    Autonomic dysreflexia    Neurogenic bladder    Vertebral fracture, osteoporotic (HCC)              No pertinent past surgical history.              No pertinent social history.            Review of Systems    Positive for stated complaint: bleeding  Other systems are as noted in HPI.  Constitutional and vital signs reviewed.      All other systems reviewed and negative except as noted above.    Physical Exam     ED Triage Vitals [04/21/24 1618]   /86   Pulse 86   Resp 18   Temp 97.9 °F (36.6 °C)   Temp src Oral   SpO2 97 %   O2 Device None (Room air)       Current:/84   Pulse 55   Temp 97.9 °F (36.6 °C) (Oral)   Resp 18   Ht 149.9 cm (4' 11\")   Wt 90.7 kg   LMP 04/14/2024 (Exact Date)   SpO2 97%   BMI 40.40 kg/m²         Physical Exam  Vitals and nursing note reviewed.   Constitutional:       Appearance: She is well-developed.   HENT:      Head: Normocephalic and atraumatic.   Eyes:      Extraocular Movements: Extraocular movements intact.   Cardiovascular:      Rate and Rhythm: Normal rate and regular rhythm.      Heart sounds: Normal  heart sounds.   Pulmonary:      Effort: Pulmonary effort is normal.      Breath sounds: Normal breath sounds.   Abdominal:      General: There is no distension.      Palpations: Abdomen is soft.      Tenderness: There is abdominal tenderness. There is no right CVA tenderness or left CVA tenderness.      Comments: Tender to palpation in the suprapubic region   Musculoskeletal:         General: Normal range of motion.      Cervical back: Normal range of motion.   Skin:     General: Skin is warm.   Neurological:      Mental Status: She is alert.      Comments: No focal deficits       Differential diagnosis includes but is not limited to UTI, pyelonephritis, TOA, ovarian cyst or torsion        ED Course     Labs Reviewed   URINALYSIS WITH CULTURE REFLEX - Abnormal; Notable for the following components:       Result Value    Clarity Urine Turbid (*)     Blood Urine 3+ (*)     Protein Urine 30 (*)     Nitrite Urine 2+ (*)     Leukocyte Esterase Urine 500 (*)     WBC Urine >50 (*)     RBC Urine >10 (*)     Bacteria Urine 2+ (*)     Squamous Epi. Cells Few (*)     WBC Clump Present (*)     All other components within normal limits   CBC W/ DIFFERENTIAL - Abnormal; Notable for the following components:    WBC 11.6 (*)     Neutrophil Absolute Prelim 9.25 (*)     Neutrophil Absolute 9.25 (*)     All other components within normal limits   BASIC METABOLIC PANEL (8) - Normal   POCT PREGNANCY URINE - Normal   CBC WITH DIFFERENTIAL WITH PLATELET    Narrative:     The following orders were created for panel order CBC With Differential With Platelet.  Procedure                               Abnormality         Status                     ---------                               -----------         ------                     CBC W/ DIFFERENTIAL[[id number]]          Abnormal            Final result                 Please view results for these tests on the individual orders.   URINE CULTURE, ROUTINE             US PELVIS EV W DOPPLER  ([id number]=[id number])    Result Date: 4/21/2024  CONCLUSION:    No evidence of ovarian torsion.  Unremarkable appearance of the ovaries.  Normal endometrial thickness of 3 mm.  No fluid or mass in the endometrial canal.  No free fluid in the pelvis.    Dictated by (CST): Anthony Mcqueen MD on 4/21/2024 at 7:26 PM     Finalized by (CST): Anthony Mcqueen MD on 4/21/2024 at 7:28 PM                  [facility]                                   Medical Decision Making  Vitals are stable in the ED and patient is well-appearing and ambulatory.    CBC with slight leukocytosis.  BMP unremarkable.  Urinalysis consistent with UTI.  Rocephin ordered.  Per my independent interpretation of pelvic ultrasound there is no concern for ovarian torsion or TOA.  Will treat with course of antibiotics for UTI.  Outpatient follow-up advised and patient comfortable with the plan.      Problems Addressed:  Acute cystitis without hematuria: acute illness or injury with systemic symptoms    Amount and/or Complexity of Data Reviewed  Labs: ordered. Decision-making details documented in ED Course.  Radiology: ordered and independent interpretation performed. Decision-making details documented in ED Course.    Risk  Prescription drug management.        Disposition and Plan     Clinical Impression:  1. Acute cystitis without hematuria         Disposition:  Discharge  4/21/2024  7:37 pm    Follow-up:  Yanira Moran MD  [address] [place] [address] [phone]    Follow up in 1 week(s)            Medications Prescribed:  Discharge Medication List as of 4/21/2024  7:54 PM        START taking these medications    Details   cephalexin 500 MG Oral Cap Take 1 capsule (500 mg total) by mouth 2 (two) times daily for 7 days., Normal, Disp-14 capsule, R-0

## 2024-04-23 NOTE — PROGRESS NOTES
ED Culture Callback Results Review    Pharmacist reviewed culture results from ED visit .    Final urine culture positive for e. coli. Patient was prescribed Cephalexin (Keflex) on discharge. Current therapy is appropriate based on reported susceptibilities. No further intervention required at this time.      Mary Campo, Saige  Emergency Medicine Pharmacist Specialist  04/23/24; 11:36 AM

## 2024-06-24 ENCOUNTER — TELEPHONE (OUTPATIENT)
Dept: INTERNAL MEDICINE CLINIC | Facility: CLINIC | Age: 19
End: 2024-06-24

## 2024-06-24 NOTE — TELEPHONE ENCOUNTER
Spoke to patient. Her sister saw Deepali Douglas and was given a referral for a therapist. At the therapy appointment., she was asked if she was having  the same symptoms as her sister and she said yes so the therapist recommended she get a referral as well.     She said that she has had PTSD/anxiety/depression for a long time but has never been treated for it.     Appointment is today and patient will speak to Deepali at 4:20 PM.

## 2024-06-24 NOTE — TELEPHONE ENCOUNTER
Booked via [employer]      Message    Appointment For: Violeta Ba ([id number])   Visit Type: [employer] V[employer] VISIT ([id number])      6/24/2024    4:20 PM  20 mins.  Deepali Douglas            [facility]-INTERNAL MED2      Patient Comments:   Mental health

## 2024-06-27 ENCOUNTER — TELEPHONE (OUTPATIENT)
Age: 19
End: 2024-06-27

## 2024-06-27 NOTE — TELEPHONE ENCOUNTER
Laura Mcdaniel,    I'm glad that we were able to connect today. Here are some therapy resources that may be a good fit. Please verify your insurance coverage with any providers that you may choose to call and schedule with directly. If distance is a concern for any of these providers, I'd recommend inquiring about virtual/telehealth services.     If you need further assistance, please give our office a call at [phone]. If you need more immediate assistance, or assistance outside of business hours, please contact the [facility] 24/7 helpline at [phone].    Radha Sandoval LCPC  [facility]  [address], [place], [place], [address]  Phone: [phone]    [facility] [place]  [facility]  [address], [place], [place], [address]  Phone: [phone]    [facility]  [facility]  [address], [address], [place], [place], [address]  Phone: [phone]     Chiara Russ LPC  [facility]  [address], [address], [place], [address]  Phone: [phone]    [facility]  [address], [address], [place], [address]  Phone: [phone]    [facility]  [address], [place], [address]  Phone: [phone]    Yamilet CULP LCSW (she/her/hers)  Patient Care Navigator - Mental Health  [facility]/Mental Health Division    [email or web address]  Request an assessment or support »

## 2024-06-27 NOTE — TELEPHONE ENCOUNTER
Hello,  Sorry I missed you - I am reaching out from the [facility] Navigation department, following up on an order from your provider's office to assist in connecting you with resources for care. If you would like to discuss this further, please give us a call back at [phone], or for more immediate assistance you can contact our 24-hour help line at [phone]. We look forward to hearing from you soon.

## 2024-07-23 ENCOUNTER — APPOINTMENT (OUTPATIENT)
Dept: GENERAL RADIOLOGY | Age: 19
End: 2024-07-23
Attending: NURSE PRACTITIONER
Payer: MEDICAID

## 2024-07-23 ENCOUNTER — HOSPITAL ENCOUNTER (OUTPATIENT)
Age: 19
Discharge: HOME | End: 2024-07-23
Payer: MEDICAID

## 2024-07-23 VITALS
SYSTOLIC BLOOD PRESSURE: 137 MMHG | DIASTOLIC BLOOD PRESSURE: 82 MMHG | OXYGEN SATURATION: 99 % | HEART RATE: 78 BPM | RESPIRATION RATE: 18 BRPM | TEMPERATURE: 97 F

## 2024-07-23 DIAGNOSIS — S90.02XA CONTUSION OF LEFT ANKLE, INITIAL ENCOUNTER: Primary | ICD-10-CM

## 2024-07-23 DIAGNOSIS — S90.32XA CONTUSION OF LEFT FOOT, INITIAL ENCOUNTER: ICD-10-CM

## 2024-07-23 DIAGNOSIS — S80.02XA CONTUSION OF LEFT KNEE, INITIAL ENCOUNTER: ICD-10-CM

## 2024-07-23 DIAGNOSIS — M25.462 EFFUSION OF LEFT KNEE: ICD-10-CM

## 2024-07-23 PROCEDURE — 99213 OFFICE O/P EST LOW 20 MIN: CPT

## 2024-07-23 PROCEDURE — 73610 X-RAY EXAM OF ANKLE: CPT | Performed by: NURSE PRACTITIONER

## 2024-07-23 PROCEDURE — 73560 X-RAY EXAM OF KNEE 1 OR 2: CPT | Performed by: NURSE PRACTITIONER

## 2024-07-23 PROCEDURE — 99214 OFFICE O/P EST MOD 30 MIN: CPT

## 2024-07-23 PROCEDURE — 73630 X-RAY EXAM OF FOOT: CPT | Performed by: NURSE PRACTITIONER

## 2024-07-23 NOTE — ED PROVIDER NOTES
Patient Seen in: [facility] [place]      History     Chief Complaint   Patient presents with    Foot Injury     Stated Complaint: foot injury    Subjective:   This is a 18-year-old  female accompanied by her mother with a chief complaint of pain in her left knee, left ankle, and left foot.  Patient states her father's tablesaw fell onto the left lateral ankle and foot approximately 1 hour prior to arrival.  Patient states she fell down onto her left knee.  She denies striking her head or any other injury.  Patient does have chronic knee pain she has a torn meniscus which she thinks is the lateral meniscus.  The mother states that she was supposed to schedule an appointment with an orthopedic surgeon  more than a year ago but the patient has been hesitant to do that.  The patient denies any break in the skin or bleeding.  She does admit to numbness and decreased sensation in the fourth and fifth digit of the left foot.  Pain significantly increases with ambulation.  She denies possibility of pregnancy.  Of note the patient has a surgical repair of a C1 and C2 fracture and has various sequelae as a result of that including neurogenic bladder, and autonomic dysreflexia.  She also has a history of intermittent atrial fibrillation however she states the last time that was noted was approximately 3 years ago.    The history is provided by the patient and a parent.           Objective:   Past Medical History:    A-fib (HCC)    Autonomic dysreflexia    Neurogenic bladder    Vertebral fracture, osteoporotic (HCC)              Past Surgical History:   Procedure Laterality Date    Excis lumbar disk,one level      Laminectomy                  Social History     Socioeconomic History    Marital status: Single   Tobacco Use    Smoking status: Never     Passive exposure: Yes    Smokeless tobacco: Never   Vaping Use    Vaping status: Never Used   Substance and Sexual Activity    Alcohol use: Never    Drug use: Never      Social Determinants of Health     Food Insecurity: No Food Insecurity (11/8/2023)    Food Insecurity     Food Insecurity: Never true   Transportation Needs: No Transportation Needs (11/8/2023)    Transportation Needs     Lack of Transportation: No   Housing Stability: Low Risk  (11/8/2023)    Housing Stability     Housing Instability: No   Recent Concern: Housing Stability - At Risk (8/18/2023)    Received from [facility]              Review of Systems   Constitutional:  Positive for activity change.   Respiratory:  Negative for shortness of breath.    Cardiovascular:  Negative for chest pain and palpitations.   Gastrointestinal:  Negative for abdominal pain.   Genitourinary:  Positive for difficulty urinating (Patient states her neurogenic bladder symptoms are minimal, much improved.).   Musculoskeletal:  Positive for arthralgias (Pain left knee, left ankle, left foot.), gait problem and joint swelling. Negative for back pain.   Skin:  Positive for color change (Erythema over the lateral malleolus, no break in skin integrity no ecchymosis.).   Allergic/Immunologic: Negative for immunocompromised state.   Neurological:  Negative for dizziness and syncope.       Positive for stated Chief Complaint: Foot Injury    Other systems are as noted in HPI.  Constitutional and vital signs reviewed.      All other systems reviewed and negative except as noted above.    Physical Exam     ED Triage Vitals [07/23/24 1220]   /82   Pulse 78   Resp 18   Temp 97.2 °F (36.2 °C)   Temp src Temporal   SpO2 99 %   O2 Device None (Room air)       Current Vitals:   Vital Signs  BP: 137/82  Pulse: 78  Resp: 18  Temp: 97.2 °F (36.2 °C)  Temp src: Temporal    Oxygen Therapy  SpO2: 99 %  O2 Device: None (Room air)            Physical Exam  Vitals and nursing note reviewed.   Constitutional:       General: She is not in acute distress.     Appearance: Normal appearance. She is obese. She is not ill-appearing.   HENT:       Head: Normocephalic and atraumatic.      Nose: Nose normal.      Mouth/Throat:      Mouth: Mucous membranes are moist.   Eyes:      Conjunctiva/sclera: Conjunctivae normal.   Neck:      Comments: No tenderness with palpation of cervical spine assessment of range of motion deferred due to prior C1-C2 fracture and stabilization.  Cardiovascular:      Rate and Rhythm: Normal rate and regular rhythm.      Pulses: Normal pulses.      Heart sounds: No murmur heard.  Pulmonary:      Effort: Pulmonary effort is normal. No respiratory distress.      Breath sounds: Normal breath sounds. No wheezing, rhonchi or rales.   Abdominal:      General: There is no distension.      Palpations: Abdomen is soft.      Tenderness: There is no abdominal tenderness.      Comments: No pain with palpation of bony pelvis or with compression of pelvis.   Musculoskeletal:         General: Swelling, tenderness and signs of injury present. No deformity.      Comments: Left lower extremity: Left hip with full active range of motion.  No pain with palpation.  Left knee with full flexion and extension, pain with palpation inferior to patella and bilateral inferior joint lines.  No erythema or ecchymosis.  No pain with palpation of the patella, distal femur or proximal tibia.  Left ankle: Marked swelling and moderate erythema over lateral malleolus.  Patient tender to palpation over malleolus and distal over dorsum of foot.  Pain with palpation over fourth and fifth metatarsal.  Full flexion and extension of foot and ankle however painful against resistance.  Full active range of motion of all toes.  Mild decrease in sensation noted over fourth and fifth toe only.  No obvious deformity.   Skin:     General: Skin is warm and dry.      Capillary Refill: Capillary refill takes 2 to 3 seconds.      Findings: Erythema (Moderate erythema only over left lateral malleolus) present. No bruising or lesion.   Neurological:      General: No focal deficit  present.      Mental Status: She is alert and oriented to person, place, and time.      Sensory: Sensory deficit (Mild decrease in sensation of the fourth and fifth left toes as compared to other toes on same foot.) present.      Gait: Gait abnormal (Unable to bear weight at time of exam due to pain.).   Psychiatric:         Mood and Affect: Mood normal.         Behavior: Behavior normal.      Comments: When asked questions patient's mother often times has to answer for the patient.               ED Course   Labs Reviewed - No data to display                   MDM                                        Medical Decision Making  Differential diagnoses: Fracture lateral malleolus, sprain of ankle, contusion, fracture of foot most likely fourth or fifth metatarsals, contusion to left knee, further extension of meniscal tear.  Cormorbidities adding complexity: Obesity, current tear of meniscus and left knee.  My independent interpretation of studies: Left knee, left ankle, and left foot x-rays negative for fracture dislocation.  Soft tissue swelling noted, reading confirmed by radiology report and also noted a left knee effusion.  Social determinants of health affecting care: None noted  Shared decision making done by: The patient and her mother and myself.  Patient observed weightbearing and taking 8-10 stops safely prior to discharge.  She was advised to continue wearing the compression leggings for the knee effusion and when not wearing the compression leggings to use an ace wrap.  Patient advised to use the postop shoe until she is able to walk without any discomfort.  Usual RICE instructions were provided.  A referral was given to Dr. Abdalla's group for nonurgent orthopedic follow-up.     Counseled: Patient, regarding diagnosis, regarding treatment plan, regarding diagnostic results, regarding prescription, I have discussed with the patient the results of tests, differential diagnosis, and warning signs and  symptoms that should prompt immediate return. The patient understands these instructions and agrees to the follow-up plan provided. There is no barriers to learning. Appropriate f/u given. Emergency precautions given. Patient agrees to return for any concerns/ problems/complications.            Disposition and Plan     Clinical Impression:  1. Contusion of left ankle, initial encounter    2. Contusion of left foot, initial encounter    3. Contusion of left knee, initial encounter    4. Effusion of left knee         Disposition:  Discharge  7/23/2024  1:16 pm    Follow-up:  Yanira Moran MD  [address] [place] [id number] [phone]    In 1 week      Wayne Abdalla MD  [address] [id number] [phone]    In 3 days            Medications Prescribed:  Discharge Medication List as of 7/23/2024  1:17 PM

## 2024-07-23 NOTE — ED INITIAL ASSESSMENT (HPI)
Presents with pain to left foot. States a table saw fell over and landed on foot when she was moving it about 1 hour PTA.

## 2024-07-23 NOTE — DISCHARGE INSTRUCTIONS
As we discussed there are no broken bones seen on the x-rays.  The effusion of your knee will be helped with wearing the compression yoga pants and or using an Ace wrap.  You may walk on the left leg however wear the hard soled shoe until you can walk without any discomfort.  Take 400 mg 3 times a day with food to decrease inflammation, and you may take 1000 mg of Tylenol every 8 hours if needed for pain.  Continue to apply cold packs to the knee the ankle and the foot for the first 2 days.  Keep the foot elevated to reduce the swelling.  Follow-up with Dr. Abdalla's office you may see anyone in the office call tomorrow to schedule an appointment.  If you develop any worsening of your symptoms or other concerns return here, see your primary care provider or go to the ER.  Thank you for choosing our [facility] for your medical condition today. Please be sure to follow up with your primary care provider in 2 days if no improvement, or as directed. If any worsening of your symptoms or other concerns call your primary care provider, return here or go to the emergency department.    
C/o SOB + covid

## 2024-09-03 DIAGNOSIS — B34.9 ACUTE VIRAL SYNDROME: ICD-10-CM

## 2024-09-03 DIAGNOSIS — J45.909: ICD-10-CM

## 2024-09-05 ENCOUNTER — TELEMEDICINE (OUTPATIENT)
Dept: INTERNAL MEDICINE CLINIC | Facility: CLINIC | Age: 19
End: 2024-09-05
Payer: MEDICAID

## 2024-09-05 DIAGNOSIS — J45.901: Primary | ICD-10-CM

## 2024-09-05 PROCEDURE — 99214 OFFICE O/P EST MOD 30 MIN: CPT | Performed by: NURSE PRACTITIONER

## 2024-09-05 RX ORDER — PREDNISONE 20 MG/1
20 TABLET ORAL 2 TIMES DAILY
Qty: 10 TABLET | Refills: 0 | Status: SHIPPED | OUTPATIENT
Start: 2024-09-05 | End: 2024-09-10

## 2024-09-05 RX ORDER — ALBUTEROL SULFATE 90 UG/1
2 AEROSOL, METERED RESPIRATORY (INHALATION) EVERY 4 HOURS PRN
Qty: 18 G | Refills: 5 | Status: SHIPPED | OUTPATIENT
Start: 2024-09-05

## 2024-09-05 RX ORDER — BUDESONIDE AND FORMOTEROL FUMARATE DIHYDRATE 160; 4.5 UG/1; UG/1
2 AEROSOL RESPIRATORY (INHALATION) 2 TIMES DAILY
Qty: 10.2 G | Refills: 2 | Status: SHIPPED | OUTPATIENT
Start: 2024-09-05

## 2024-09-05 NOTE — PROGRESS NOTES
Violeta Ba is a 18 year old female.    This visit is conducted using Telemedicine with live, interactive video and audio.    Patient has been referred to the [facility] website at [email or web address] to review the yearly Consent to Treat document.    Patient understands and accepts financial responsibility for any deductible, co-insurance and/or co-pays associated with this service.    HPI:   Pt requests refill on albuterol. She reports last night she had a strong cough with wheezing, today reports improvement but not resolved. Denies any runny nose, CP, SOB, fever, chills, sinus pressure or pain, dizziness, HA, sore throat, palpitations, fatigue ,body aches. Did not test for covid. Hx of allergies.   Current Outpatient Medications   Medication Sig Dispense Refill    predniSONE 20 MG Oral Tab Take 1 tablet (20 mg total) by mouth 2 (two) times daily for 5 days. 10 tablet 0    albuterol 108 (90 Base) MCG/ACT Inhalation Aero Soln Inhale 2 puffs into the lungs every 4 (four) hours as needed for Wheezing or Shortness of Breath. 18 g 5    Budesonide-Formoterol Fumarate (SYMBICORT) 160-4.5 MCG/ACT Inhalation Aerosol Inhale 2 puffs into the lungs 2 (two) times daily. 10.2 g 2    benzonatate 200 MG Oral Cap Take 1 capsule (200 mg total) by mouth 3 (three) times daily as needed for cough. 30 capsule 0    sodium chloride 0.65 % Nasal Solution 2 sprays by Nasal route as needed. 60 mL 0      Past Medical History:    A-fib (HCC)    Autonomic dysreflexia    Neurogenic bladder    Vertebral fracture, osteoporotic (HCC)      Social History:  Social History     Socioeconomic History    Marital status: Single   Tobacco Use    Smoking status: Never     Passive exposure: Yes    Smokeless tobacco: Never   Vaping Use    Vaping status: Never Used   Substance and Sexual Activity    Alcohol use: Never    Drug use: Never     Social Determinants of Health     Food Insecurity: No Food Insecurity (11/8/2023)    Food Insecurity     Food  Insecurity: Never true   Transportation Needs: No Transportation Needs (11/8/2023)    Transportation Needs     Lack of Transportation: No   Housing Stability: Low Risk  (11/8/2023)    Housing Stability     Housing Instability: No   Recent Concern: Housing Stability - At Risk (8/18/2023)    [facility]        REVIEW OF SYSTEMS:   Review of Systems   All other systems reviewed and are negative.         EXAM:   LMP 04/14/2024 (Exact Date)     Physical Exam  Constitutional:       General: She is not in acute distress.  Pulmonary:      Effort: Pulmonary effort is normal. No respiratory distress.   Neurological:      Mental Status: She is alert and oriented to person, place, and time.   Psychiatric:         Mood and Affect: Mood normal.         Behavior: Behavior normal.         Thought Content: Thought content normal.         Judgment: Judgment normal.            ASSESSMENT AND PLAN:   1. Cold-induced asthma with acute exacerbation, unspecified asthma severity, unspecified whether persistent (HCC)  -Start steroid burst, albuterol PRN.  -Start Symbicort daily. Pt reports she feels asthma not well controlled over recent months. Advised daily controller inhaler, reviewed use, rinse mouth after use.   -Reviewed alarm signs/when to go to ER. Pt to consider covid testing.   - predniSONE 20 MG Oral Tab; Take 1 tablet (20 mg total) by mouth 2 (two) times daily for 5 days.  Dispense: 10 tablet; Refill: 0  - albuterol 108 (90 Base) MCG/ACT Inhalation Aero Soln; Inhale 2 puffs into the lungs every 4 (four) hours as needed for Wheezing or Shortness of Breath.  Dispense: 18 g; Refill: 5  - Budesonide-Formoterol Fumarate (SYMBICORT) 160-4.5 MCG/ACT Inhalation Aerosol; Inhale 2 puffs into the lungs 2 (two) times daily.  Dispense: 10.2 g; Refill: 2       The patient indicates understanding of these issues and agrees to the plan.  The patient is asked to return in PRN.     The above note was  creating using Dragon speech recognition technology. Please excuse any typos.

## 2024-09-06 RX ORDER — ALBUTEROL SULFATE 90 UG/1
2 AEROSOL, METERED RESPIRATORY (INHALATION) EVERY 4 HOURS PRN
Qty: 18 G | Refills: 5 | OUTPATIENT
Start: 2024-09-06

## 2024-09-17 ENCOUNTER — TELEMEDICINE (OUTPATIENT)
Dept: INTERNAL MEDICINE CLINIC | Facility: CLINIC | Age: 19
End: 2024-09-17
Payer: MEDICAID

## 2024-09-17 DIAGNOSIS — G90.4 AUTONOMIC DYSREFLEXIA: Primary | ICD-10-CM

## 2024-09-17 PROCEDURE — 99213 OFFICE O/P EST LOW 20 MIN: CPT | Performed by: NURSE PRACTITIONER

## 2024-09-18 NOTE — PROGRESS NOTES
Violeta Ba is a 18 year old female.    This visit is conducted using Telemedicine with live, interactive video and audio.    Patient has been referred to the [facility] website at [email or web address] to review the yearly Consent to Treat document.    Patient understands and accepts financial responsibility for any deductible, co-insurance and/or co-pays associated with this service.    HPI:   Pt is requesting letter to be excused from Jury Duty d/t Autonomic Dysreflexia. She reports stressful situations trigger her anxiety which then cuases her BP to rise and has difficulty later controlling it. Feels Jury Duty would exacerbate her condition.   Current Outpatient Medications   Medication Sig Dispense Refill    albuterol 108 (90 Base) MCG/ACT Inhalation Aero Soln Inhale 2 puffs into the lungs every 4 (four) hours as needed for Wheezing or Shortness of Breath. 18 g 5    Budesonide-Formoterol Fumarate (SYMBICORT) 160-4.5 MCG/ACT Inhalation Aerosol Inhale 2 puffs into the lungs 2 (two) times daily. 10.2 g 2    benzonatate 200 MG Oral Cap Take 1 capsule (200 mg total) by mouth 3 (three) times daily as needed for cough. 30 capsule 0    sodium chloride 0.65 % Nasal Solution 2 sprays by Nasal route as needed. 60 mL 0      Past Medical History:    A-fib (HCC)    Autonomic dysreflexia    Neurogenic bladder    Vertebral fracture, osteoporotic (HCC)      Social History:  Social History     Socioeconomic History    Marital status: Single   Tobacco Use    Smoking status: Never     Passive exposure: Yes    Smokeless tobacco: Never   Vaping Use    Vaping status: Never Used   Substance and Sexual Activity    Alcohol use: Never    Drug use: Never     Social Determinants of Health     Food Insecurity: No Food Insecurity (11/8/2023)    Food Insecurity     Food Insecurity: Never true   Transportation Needs: No Transportation Needs (11/8/2023)    Transportation Needs     Lack of Transportation: No   Housing Stability: Low Risk   (11/8/2023)    Housing Stability     Housing Instability: No   Recent Concern: Housing Stability - At Risk (8/18/2023)    Received from [facility], [facility]        REVIEW OF SYSTEMS:   Review of Systems   All other systems reviewed and are negative.         EXAM:   LMP 04/14/2024 (Exact Date)     Physical Exam  Constitutional:       General: She is not in acute distress.  Pulmonary:      Effort: No respiratory distress.   Neurological:      Mental Status: She is alert and oriented to person, place, and time.   Psychiatric:         Mood and Affect: Mood normal.         Behavior: Behavior normal.         Thought Content: Thought content normal.         Judgment: Judgment normal.            ASSESSMENT AND PLAN:   1. Autonomic dysreflexia  -excuse from jury duty letter provided,.        The patient indicates understanding of these issues and agrees to the plan.  The patient is asked to return in PRN/2 months. .     The above note was creating using Dragon speech recognition technology. Please excuse any typos.

## 2024-11-14 ENCOUNTER — TELEMEDICINE (OUTPATIENT)
Dept: INTERNAL MEDICINE CLINIC | Facility: CLINIC | Age: 19
End: 2024-11-14
Payer: MEDICAID

## 2024-11-14 DIAGNOSIS — L20.9 ATOPIC DERMATITIS, UNSPECIFIED TYPE: Primary | ICD-10-CM

## 2024-11-14 PROCEDURE — 99213 OFFICE O/P EST LOW 20 MIN: CPT | Performed by: NURSE PRACTITIONER

## 2024-11-14 RX ORDER — KETOCONAZOLE 20 MG/G
CREAM TOPICAL
Qty: 30 G | Refills: 0 | Status: SHIPPED | OUTPATIENT
Start: 2024-11-14

## 2024-11-14 RX ORDER — KETOCONAZOLE 20 MG/ML
1 SHAMPOO, SUSPENSION TOPICAL
Qty: 120 ML | Refills: 0 | Status: SHIPPED | OUTPATIENT
Start: 2024-11-14

## 2024-11-14 NOTE — PROGRESS NOTES
Violeta Ba is a 18 year old female.    This visit is conducted using Telemedicine with live, interactive video and audio.    Patient has been referred to the [facility] website at [email or web address] to review the yearly Consent to Treat document.    Patient understands and accepts financial responsibility for any deductible, co-insurance and/or co-pays associated with this service.   HPI:   Pt reports rash on arms and forehead, has a red ring and is itchy, denies any bug bites, believes she has ring worm. Denies any fever, chills, CP, SOB, HA, dizziness, no other rashes reported, no recent travel.   Current Outpatient Medications   Medication Sig Dispense Refill    ketoconazole 2 % External Cream Apply to affected area twice daily 30 g 0    ketoconazole 2 % External Shampoo Apply 1 Application topically twice a week. 120 mL 0    albuterol 108 (90 Base) MCG/ACT Inhalation Aero Soln Inhale 2 puffs into the lungs every 4 (four) hours as needed for Wheezing or Shortness of Breath. 18 g 5    Budesonide-Formoterol Fumarate (SYMBICORT) 160-4.5 MCG/ACT Inhalation Aerosol Inhale 2 puffs into the lungs 2 (two) times daily. 10.2 g 2    benzonatate 200 MG Oral Cap Take 1 capsule (200 mg total) by mouth 3 (three) times daily as needed for cough. 30 capsule 0    sodium chloride 0.65 % Nasal Solution 2 sprays by Nasal route as needed. 60 mL 0      Past Medical History:    A-fib (HCC)    Autonomic dysreflexia    Neurogenic bladder    Vertebral fracture, osteoporotic (HCC)      Social History:  Social History     Socioeconomic History    Marital status: Single   Tobacco Use    Smoking status: Never     Passive exposure: Yes    Smokeless tobacco: Never   Vaping Use    Vaping status: Never Used   Substance and Sexual Activity    Alcohol use: Never    Drug use: Never     Social Drivers of Health     Food Insecurity: No Food Insecurity (11/8/2023)    Food Insecurity     Food Insecurity: Never true   Transportation Needs: No  Transportation Needs (11/8/2023)    Transportation Needs     Lack of Transportation: No   Housing Stability: Low Risk  (11/8/2023)    Housing Stability     Housing Instability: No   Recent Concern: Housing Stability - At Risk (8/18/2023)    [facility]        REVIEW OF SYSTEMS:   Review of Systems   All other systems reviewed and are negative.         EXAM:   LMP 04/14/2024 (Exact Date)     Physical Exam  Pulmonary:      Effort: Pulmonary effort is normal. No respiratory distress.   Neurological:      Mental Status: She is alert and oriented to person, place, and time.   Psychiatric:         Mood and Affect: Mood normal.         Thought Content: Thought content normal.         Judgment: Judgment normal.            ASSESSMENT AND PLAN:   1. Atopic dermatitis, unspecified type  -Discussed limitations of video visit, advised in person.  -trial of topical antifungal cream and shampoo. Reviewed use.   - ketoconazole 2 % External Cream; Apply to affected area twice daily  Dispense: 30 g; Refill: 0  - ketoconazole 2 % External Shampoo; Apply 1 Application topically twice a week.  Dispense: 120 mL; Refill: 0       The patient indicates understanding of these issues and agrees to the plan.  The patient is asked to return in 1-2 weeks.     The above note was creating using Dragon speech recognition technology. Please excuse any typos.

## 2024-12-09 ENCOUNTER — LAB ENCOUNTER (OUTPATIENT)
Dept: LAB | Age: 19
End: 2024-12-09
Attending: NURSE PRACTITIONER
Payer: MEDICAID

## 2024-12-09 ENCOUNTER — TELEMEDICINE (OUTPATIENT)
Dept: INTERNAL MEDICINE CLINIC | Facility: CLINIC | Age: 19
End: 2024-12-09
Payer: MEDICAID

## 2024-12-09 DIAGNOSIS — N39.0 RECURRENT UTI (URINARY TRACT INFECTION): ICD-10-CM

## 2024-12-09 DIAGNOSIS — N39.0 RECURRENT UTI (URINARY TRACT INFECTION): Primary | ICD-10-CM

## 2024-12-09 LAB
BILIRUB UR QL: NEGATIVE
CLARITY UR: CLEAR
COLOR UR: COLORLESS
GLUCOSE UR-MCNC: NORMAL MG/DL
KETONES UR-MCNC: NEGATIVE MG/DL
LEUKOCYTE ESTERASE UR QL STRIP.AUTO: NEGATIVE
NITRITE UR QL STRIP.AUTO: NEGATIVE
PH UR: 7.5 [PH] (ref 5–8)
PROT UR-MCNC: NEGATIVE MG/DL
SP GR UR STRIP: 1 (ref 1–1.03)
UROBILINOGEN UR STRIP-ACNC: NORMAL

## 2024-12-09 PROCEDURE — 87086 URINE CULTURE/COLONY COUNT: CPT

## 2024-12-09 PROCEDURE — 87077 CULTURE AEROBIC IDENTIFY: CPT

## 2024-12-09 PROCEDURE — 87186 SC STD MICRODIL/AGAR DIL: CPT

## 2024-12-09 PROCEDURE — 81001 URINALYSIS AUTO W/SCOPE: CPT

## 2024-12-09 RX ORDER — CEPHALEXIN 500 MG/1
500 CAPSULE ORAL 2 TIMES DAILY
Qty: 14 CAPSULE | Refills: 0 | Status: SHIPPED | OUTPATIENT
Start: 2024-12-09 | End: 2024-12-16

## 2024-12-09 NOTE — PROGRESS NOTES
Violeta Ba is a 18 year old female.    This visit is conducted using Telemedicine with live, interactive video and audio.    Patient has been referred to the [facility] at the [facility] website at [email or web address] to review the yearly Consent to Treat document.    Patient understands and accepts financial responsibility for any deductible, co-insurance and/or co-pays associated with this service.    HPI:   Pt c/o UTI sx. Reports sx started 2 days. She reports burning, frequency and urgency. Denies any fever, chills, abdominal pain, NVDC, blood in urine. Reports 2-3 UTI's per year. She has not seen urology. Pt reports she is sexually active, denies any concern for UTI, declines testing for STI. Denies any chance of pregnancy. LMP 11/25/2024.  Current Outpatient Medications   Medication Sig Dispense Refill    cephALEXin 500 MG Oral Cap Take 1 capsule (500 mg total) by mouth 2 (two) times daily for 7 days. 14 capsule 0    ketoconazole 2 % External Cream Apply to affected area twice daily 30 g 0    ketoconazole 2 % External Shampoo Apply 1 Application topically twice a week. 120 mL 0    albuterol 108 (90 Base) MCG/ACT Inhalation Aero Soln Inhale 2 puffs into the lungs every 4 (four) hours as needed for Wheezing or Shortness of Breath. 18 g 5    Budesonide-Formoterol Fumarate (SYMBICORT) 160-4.5 MCG/ACT Inhalation Aerosol Inhale 2 puffs into the lungs 2 (two) times daily. 10.2 g 2    benzonatate 200 MG Oral Cap Take 1 capsule (200 mg total) by mouth 3 (three) times daily as needed for cough. 30 capsule 0    sodium chloride 0.65 % Nasal Solution 2 sprays by Nasal route as needed. 60 mL 0      Past Medical History:    A-fib (HCC)    Autonomic dysreflexia    Neurogenic bladder    Vertebral fracture, osteoporotic (HCC)      Social History:  Social History     Socioeconomic History    Marital status: Single   Tobacco Use    Smoking status: Never     Passive exposure: Yes    Smokeless tobacco: Never   Vaping Use    Vaping status: Never  Used   Substance and Sexual Activity    Alcohol use: Never    Drug use: Never     Social Drivers of Health     Food Insecurity: No Food Insecurity (11/8/2023)    Food Insecurity     Food Insecurity: Never true   Transportation Needs: No Transportation Needs (11/8/2023)    Transportation Needs     Lack of Transportation: No   Housing Stability: Low Risk  (11/8/2023)    Housing Stability     Housing Instability: No   Recent Concern: Housing Stability - At Risk (8/18/2023)    Received from [facility], [facility]        REVIEW OF SYSTEMS:   Review of Systems   All other systems reviewed and are negative.         EXAM:   LMP 04/14/2024 (Exact Date)     Physical Exam  Constitutional:       General: She is not in acute distress.  Pulmonary:      Effort: Pulmonary effort is normal. No respiratory distress.   Neurological:      Mental Status: She is alert and oriented to person, place, and time.   Psychiatric:         Thought Content: Thought content normal.         Judgment: Judgment normal.            ASSESSMENT AND PLAN:   1. Recurrent UTI (urinary tract infection)  -Declines STI testing  -UA/UC ordered  -Advised Urology consult for evaluation of recurrent UTI's reported by patient.   -OTC cranberry pills, hydrate  -Start Cephalexin. Reviewed use. Tolerated in past per patient.   - Urology Referral - In Network  - Urine Culture, Routine [E]; Future  - Urinalysis, Routine [E]; Future  - cephALEXin 500 MG Oral Cap; Take 1 capsule (500 mg total) by mouth 2 (two) times daily for 7 days.  Dispense: 14 capsule; Refill: 0       The patient indicates understanding of these issues and agrees to the plan.  The patient is asked to return in PRN/annual exam due. .     The above note was creating using Dragon speech recognition technology. Please excuse any typos.

## 2025-07-31 ENCOUNTER — APPOINTMENT (OUTPATIENT)
Dept: ULTRASOUND IMAGING | Facility: HOSPITAL | Age: 20
End: 2025-07-31
Attending: EMERGENCY MEDICINE

## 2025-07-31 ENCOUNTER — HOSPITAL ENCOUNTER (EMERGENCY)
Facility: HOSPITAL | Age: 20
Discharge: HOME | End: 2025-07-31
Attending: EMERGENCY MEDICINE

## 2025-07-31 VITALS
SYSTOLIC BLOOD PRESSURE: 128 MMHG | WEIGHT: 200 LBS | TEMPERATURE: 98 F | RESPIRATION RATE: 18 BRPM | HEIGHT: 59 IN | DIASTOLIC BLOOD PRESSURE: 64 MMHG | HEART RATE: 72 BPM | OXYGEN SATURATION: 98 % | BODY MASS INDEX: 40.32 KG/M2

## 2025-07-31 DIAGNOSIS — R10.84 ABDOMINAL PAIN, GENERALIZED: Primary | ICD-10-CM

## 2025-07-31 LAB
ALBUMIN SERPL-MCNC: 5 G/DL (ref 3.2–4.8)
ALBUMIN/GLOB SERPL: 1.9 (ref 1–2)
ALP LIVER SERPL-CCNC: 62 U/L (ref 52–144)
ALT SERPL-CCNC: 23 U/L (ref 10–49)
ANION GAP SERPL CALC-SCNC: 7 MMOL/L (ref 0–18)
AST SERPL-CCNC: 21 U/L (ref ?–34)
B-HCG UR QL: NEGATIVE
BASOPHILS # BLD AUTO: 0.02 X10(3) UL (ref 0–0.2)
BASOPHILS NFR BLD AUTO: 0.3 %
BILIRUB SERPL-MCNC: 0.4 MG/DL (ref 0.3–1.2)
BILIRUB UR QL: NEGATIVE
BUN BLD-MCNC: 8 MG/DL (ref 9–23)
BUN/CREAT SERPL: 11.1 (ref 10–20)
CALCIUM BLD-MCNC: 9.7 MG/DL (ref 8.7–10.4)
CHLORIDE SERPL-SCNC: 108 MMOL/L (ref 98–112)
CLARITY UR: CLEAR
CO2 SERPL-SCNC: 25 MMOL/L (ref 21–32)
COLOR UR: COLORLESS
CREAT BLD-MCNC: 0.72 MG/DL (ref 0.55–1.02)
DEPRECATED RDW RBC AUTO: 40.2 FL (ref 35.1–46.3)
EGFRCR SERPLBLD CKD-EPI 2021: 122 ML/MIN/1.73M2 (ref 60–?)
EOSINOPHIL # BLD AUTO: 0.07 X10(3) UL (ref 0–0.7)
EOSINOPHIL NFR BLD AUTO: 0.9 %
ERYTHROCYTE [DISTWIDTH] IN BLOOD BY AUTOMATED COUNT: 13.5 % (ref 11–15)
GLOBULIN PLAS-MCNC: 2.7 G/DL (ref 2–3.5)
GLUCOSE BLD-MCNC: 95 MG/DL (ref 70–99)
GLUCOSE UR-MCNC: NORMAL MG/DL
HCT VFR BLD AUTO: 40.3 % (ref 35–48)
HGB BLD-MCNC: 13.6 G/DL (ref 12–16)
HGB UR QL STRIP.AUTO: NEGATIVE
IMM GRANULOCYTES # BLD AUTO: 0.03 X10(3) UL (ref 0–1)
IMM GRANULOCYTES NFR BLD: 0.4 %
KETONES UR-MCNC: NEGATIVE MG/DL
LEUKOCYTE ESTERASE UR QL STRIP.AUTO: NEGATIVE
LIPASE SERPL-CCNC: 27 U/L (ref 12–53)
LYMPHOCYTES # BLD AUTO: 1.78 X10(3) UL (ref 1–4)
LYMPHOCYTES NFR BLD AUTO: 23.9 %
MCH RBC QN AUTO: 27.9 PG (ref 26–34)
MCHC RBC AUTO-ENTMCNC: 33.7 G/DL (ref 31–37)
MCV RBC AUTO: 82.6 FL (ref 80–100)
MONOCYTES # BLD AUTO: 0.6 X10(3) UL (ref 0.1–1)
MONOCYTES NFR BLD AUTO: 8 %
NEUTROPHILS # BLD AUTO: 4.96 X10 (3) UL (ref 1.5–7.7)
NEUTROPHILS # BLD AUTO: 4.96 X10(3) UL (ref 1.5–7.7)
NEUTROPHILS NFR BLD AUTO: 66.5 %
NITRITE UR QL STRIP.AUTO: NEGATIVE
OSMOLALITY SERPL CALC.SUM OF ELEC: 288 MOSM/KG (ref 275–295)
PH UR: 5.5 (ref 5–8)
PLATELET # BLD AUTO: 214 10(3)UL (ref 150–450)
POTASSIUM SERPL-SCNC: 4.3 MMOL/L (ref 3.5–5.1)
PROT SERPL-MCNC: 7.7 G/DL (ref 5.7–8.2)
PROT UR-MCNC: NEGATIVE MG/DL
RBC # BLD AUTO: 4.88 X10(6)UL (ref 3.8–5.3)
SODIUM SERPL-SCNC: 140 MMOL/L (ref 136–145)
SP GR UR STRIP: 1.01 (ref 1–1.03)
UROBILINOGEN UR STRIP-ACNC: NORMAL
WBC # BLD AUTO: 7.5 X10(3) UL (ref 4–11)

## 2025-07-31 PROCEDURE — 99284 EMERGENCY DEPT VISIT MOD MDM: CPT

## 2025-07-31 PROCEDURE — 76856 US EXAM PELVIC COMPLETE: CPT | Performed by: EMERGENCY MEDICINE

## 2025-07-31 PROCEDURE — 76830 TRANSVAGINAL US NON-OB: CPT | Performed by: EMERGENCY MEDICINE

## 2025-07-31 PROCEDURE — 85025 COMPLETE CBC W/AUTO DIFF WBC: CPT | Performed by: EMERGENCY MEDICINE

## 2025-07-31 PROCEDURE — 93975 VASCULAR STUDY: CPT | Performed by: EMERGENCY MEDICINE

## 2025-07-31 PROCEDURE — 83690 ASSAY OF LIPASE: CPT

## 2025-07-31 PROCEDURE — 85025 COMPLETE CBC W/AUTO DIFF WBC: CPT

## 2025-07-31 PROCEDURE — 80053 COMPREHEN METABOLIC PANEL: CPT

## 2025-07-31 PROCEDURE — 81025 URINE PREGNANCY TEST: CPT

## 2025-07-31 PROCEDURE — 96374 THER/PROPH/DIAG INJ IV PUSH: CPT

## 2025-07-31 PROCEDURE — 83690 ASSAY OF LIPASE: CPT | Performed by: EMERGENCY MEDICINE

## 2025-07-31 PROCEDURE — 81003 URINALYSIS AUTO W/O SCOPE: CPT | Performed by: EMERGENCY MEDICINE

## 2025-07-31 PROCEDURE — 80053 COMPREHEN METABOLIC PANEL: CPT | Performed by: EMERGENCY MEDICINE

## 2025-07-31 RX ORDER — KETOROLAC TROMETHAMINE 15 MG/ML
15 INJECTION, SOLUTION INTRAMUSCULAR; INTRAVENOUS ONCE
Status: COMPLETED | OUTPATIENT
Start: 2025-07-31 | End: 2025-07-31

## 2025-07-31 RX ORDER — IBUPROFEN 600 MG/1
600 TABLET, FILM COATED ORAL EVERY 8 HOURS PRN
Qty: 15 TABLET | Refills: 0 | Status: SHIPPED | OUTPATIENT
Start: 2025-07-31 | End: 2025-08-05

## 2025-07-31 RX ORDER — DICYCLOMINE HCL 20 MG
20 TABLET ORAL 4 TIMES DAILY PRN
Qty: 20 TABLET | Refills: 0 | Status: SHIPPED | OUTPATIENT
Start: 2025-07-31 | End: 2025-08-20

## (undated) NOTE — LETTER
9/17/2024              Violeta Ba        [address] [place] [address]         To Whom It May Concern/Re: Jury Duty    Please be advised that Violeta Ba has a history of of Autonomic Dysreflexia. This condition can be triggered by various stressors and increases her blood pressure. She is requesting to be removed from Jury Duty. I have been treating her since 5/30/2023. This condition is a permanent/chronic condition and it is my opinion that she should not perform on jury duty. Please feel free to contact me with any questions.       Sincerely,    GEORGIE Price          Document electronically generated by:  GEORGIE Price

## (undated) NOTE — ED AVS SNAPSHOT
[facility] AND [facility] Immediate Care in [address] [address] [address]    Phone:  [phone]    Fax:  [phone]           Magui Vallejo   MRN: [id number]    Department:  [facility] AND [facility] Immediate Care in [address] [place] [address]   Date of Visit:  6/ related to the care you received in our Immediate Care. Please call our [address] at [phone]. Your Immediate Care team is here to serve you. You are our top priority. You were examined and treated today on an urgent basis only.   This was not I certified that I have received a copy of the aftercare instructions; that these instructions have been explained to me; all questions pertaining to these instructions have been answered in a satisfactory manner.         [email or web address] Proxy Access to your child’s MyChart go to [email or web address] and click on the   Sign Up Forms link in the Additional Information box on the right. MyChart Questions? Call [phone] for help.   MyChart is NOT to be used for urgent needs

## (undated) NOTE — ED AVS SNAPSHOT
[facility] AND [facility] Immediate Care in [facility] [address] [address] [address]    Phone:  [phone]    Fax:  [phone]           Carolann Glass   MRN: [id number]    Department:  [facility] AND [facility] Immediate Care in [place]   Date of Visit:  3/ URINARY TRACT INFECTION (UTI), WHEN YOUR CHILD HAS (ENGLISH)      Disclosure     Insurance plans vary and the physician(s) referred by the Immediate Care may not be covered by your plan.   It is possible that the physician may not participate in your healt IF THERE IS ANY CHANGE OR WORSENING OF YOUR CONDITION, CALL YOUR PRIMARY CARE PHYSICIAN AT ONCE OR GO TO THE EMERGENCY DEPARTMENT.     If you have been prescribed any medication(s), please fill your prescription right away and begin taking the medication(s) you to explore options for quitting.     - If you have concerns related to behavioral health issues or thoughts of harming yourself, contact [facility] at [phone].     - If you don’t have insurance, Melissa Mitchell

## (undated) NOTE — ED AVS SNAPSHOT
Parent/Legal Guardian Access to the Online [employer] Record of a Patient 15to 16Years Old  Return completed form by Secure email to [facility]/Medical Records Department: [email or web address]     Requirements and Procedures   Under [facility] MyChart ID and password with another person, that person may be able to view my or my child’s health information, and health information about someone who has authorized me as a MyChart proxy.    ·  I agree that it is my responsibility to select a confident Sign-Up Form and I agree to its terms.        Authorization Form     Please enter Patient’s information below:   Name (last, first, middle initial) __________________________________________   Gender  Male  Female    Last 4 Digits of Social Security Number Parent/Legal Guardian Signature                                  For Patient (1317 years of age)  I agree to allow my parent/legal guardian, named above, online access to my medical information currently available and that may become available as a result

## (undated) NOTE — LETTER
Date & Time: 8/30/2018, 3:27 PM  Patient: Moriah Stage  Encounter Provider(s):    Dottie Morales MD       To Whom It May Concern:    Moriah Graham was seen and treated in our department on 8/30/2018.  She should not participate in gym/sports until Cl

## (undated) NOTE — LETTER
1/4/2024          To Whom It May Concern:    Violeta Ba is currently under my medical care and may not return to work at this time.    Please excuse Violeta from 1/4/24 - 1/10/24.  She may return to work on 1/11/24.      If you require additional information please contact our office.        Sincerely,    Deepali Douglas, GEORGIE